# Patient Record
Sex: MALE | Race: WHITE | NOT HISPANIC OR LATINO | Employment: FULL TIME | URBAN - METROPOLITAN AREA
[De-identification: names, ages, dates, MRNs, and addresses within clinical notes are randomized per-mention and may not be internally consistent; named-entity substitution may affect disease eponyms.]

---

## 2017-01-20 ENCOUNTER — ALLSCRIPTS OFFICE VISIT (OUTPATIENT)
Dept: OTHER | Facility: OTHER | Age: 62
End: 2017-01-20

## 2017-03-13 ENCOUNTER — ALLSCRIPTS OFFICE VISIT (OUTPATIENT)
Dept: OTHER | Facility: OTHER | Age: 62
End: 2017-03-13

## 2017-09-19 ENCOUNTER — APPOINTMENT (EMERGENCY)
Dept: RADIOLOGY | Facility: HOSPITAL | Age: 62
End: 2017-09-19
Payer: COMMERCIAL

## 2017-09-19 ENCOUNTER — HOSPITAL ENCOUNTER (EMERGENCY)
Facility: HOSPITAL | Age: 62
Discharge: HOME | End: 2017-09-19
Attending: EMERGENCY MEDICINE
Payer: COMMERCIAL

## 2017-09-19 VITALS
WEIGHT: 180 LBS | SYSTOLIC BLOOD PRESSURE: 142 MMHG | TEMPERATURE: 98.1 F | BODY MASS INDEX: 32.92 KG/M2 | HEART RATE: 93 BPM | RESPIRATION RATE: 20 BRPM | OXYGEN SATURATION: 94 % | DIASTOLIC BLOOD PRESSURE: 69 MMHG

## 2017-09-19 DIAGNOSIS — J44.1 COPD EXACERBATION (HCC): Primary | ICD-10-CM

## 2017-09-19 DIAGNOSIS — J06.9 URI (UPPER RESPIRATORY INFECTION): ICD-10-CM

## 2017-09-19 PROCEDURE — 71020 HB CHEST X-RAY 2VW FRONTAL&LATL: CPT

## 2017-09-19 PROCEDURE — 94640 AIRWAY INHALATION TREATMENT: CPT

## 2017-09-19 PROCEDURE — 99283 EMERGENCY DEPT VISIT LOW MDM: CPT

## 2017-09-19 RX ORDER — ALBUTEROL SULFATE 90 UG/1
2 AEROSOL, METERED RESPIRATORY (INHALATION) EVERY 4 HOURS PRN
Qty: 1 INHALER | Refills: 0 | Status: SHIPPED | OUTPATIENT
Start: 2017-09-19

## 2017-09-19 RX ORDER — IPRATROPIUM BROMIDE AND ALBUTEROL SULFATE 2.5; .5 MG/3ML; MG/3ML
3 SOLUTION RESPIRATORY (INHALATION)
Status: DISCONTINUED | OUTPATIENT
Start: 2017-09-19 | End: 2017-09-19

## 2017-09-19 RX ORDER — IPRATROPIUM BROMIDE AND ALBUTEROL SULFATE 2.5; .5 MG/3ML; MG/3ML
3 SOLUTION RESPIRATORY (INHALATION)
Status: DISCONTINUED | OUTPATIENT
Start: 2017-09-19 | End: 2017-09-19 | Stop reason: HOSPADM

## 2017-09-19 RX ORDER — DOXYCYCLINE HYCLATE 100 MG/1
100 CAPSULE ORAL 2 TIMES DAILY
Qty: 20 CAPSULE | Refills: 0 | Status: SHIPPED | OUTPATIENT
Start: 2017-09-19 | End: 2017-09-29

## 2017-09-19 RX ADMIN — IPRATROPIUM BROMIDE AND ALBUTEROL SULFATE 3 ML: .5; 3 SOLUTION RESPIRATORY (INHALATION) at 06:26

## 2018-01-13 VITALS
BODY MASS INDEX: 33.29 KG/M2 | WEIGHT: 182 LBS | DIASTOLIC BLOOD PRESSURE: 70 MMHG | TEMPERATURE: 96.3 F | HEART RATE: 80 BPM | SYSTOLIC BLOOD PRESSURE: 160 MMHG

## 2018-01-13 VITALS
BODY MASS INDEX: 23.36 KG/M2 | WEIGHT: 172.5 LBS | DIASTOLIC BLOOD PRESSURE: 68 MMHG | RESPIRATION RATE: 16 BRPM | SYSTOLIC BLOOD PRESSURE: 102 MMHG | HEIGHT: 72 IN | HEART RATE: 72 BPM | TEMPERATURE: 96.8 F

## 2021-05-01 ENCOUNTER — APPOINTMENT (EMERGENCY)
Dept: RADIOLOGY | Facility: HOSPITAL | Age: 66
End: 2021-05-01
Payer: COMMERCIAL

## 2021-05-01 ENCOUNTER — HOSPITAL ENCOUNTER (EMERGENCY)
Facility: HOSPITAL | Age: 66
Discharge: HOME | End: 2021-05-01
Attending: EMERGENCY MEDICINE | Admitting: EMERGENCY MEDICINE
Payer: COMMERCIAL

## 2021-05-01 VITALS
TEMPERATURE: 97.7 F | SYSTOLIC BLOOD PRESSURE: 144 MMHG | HEART RATE: 52 BPM | OXYGEN SATURATION: 98 % | RESPIRATION RATE: 14 BRPM | BODY MASS INDEX: 24.38 KG/M2 | DIASTOLIC BLOOD PRESSURE: 80 MMHG | HEIGHT: 72 IN | WEIGHT: 180 LBS

## 2021-05-01 DIAGNOSIS — R10.9 ABDOMINAL PAIN: ICD-10-CM

## 2021-05-01 DIAGNOSIS — M54.9 BACK PAIN: Primary | ICD-10-CM

## 2021-05-01 DIAGNOSIS — R91.1 PULMONARY NODULE: ICD-10-CM

## 2021-05-01 LAB
ALBUMIN SERPL BCP-MCNC: 3.9 G/DL (ref 3.5–5)
ALP SERPL-CCNC: 65 U/L (ref 46–116)
ALT SERPL W P-5'-P-CCNC: 18 U/L (ref 12–78)
ANION GAP SERPL CALCULATED.3IONS-SCNC: 4 MMOL/L (ref 4–13)
AST SERPL W P-5'-P-CCNC: 15 U/L (ref 5–45)
BASOPHILS # BLD AUTO: 0.04 THOUSANDS/ΜL (ref 0–0.1)
BASOPHILS NFR BLD AUTO: 1 % (ref 0–1)
BILIRUB SERPL-MCNC: 0.53 MG/DL (ref 0.2–1)
BILIRUB UR QL STRIP: NEGATIVE
BUN SERPL-MCNC: 13 MG/DL (ref 5–25)
CALCIUM SERPL-MCNC: 8.6 MG/DL (ref 8.3–10.1)
CHLORIDE SERPL-SCNC: 110 MMOL/L (ref 100–108)
CLARITY UR: CLEAR
CO2 SERPL-SCNC: 29 MMOL/L (ref 21–32)
COLOR UR: YELLOW
COLOR, POC: YELLOW
CREAT SERPL-MCNC: 1.02 MG/DL (ref 0.6–1.3)
EOSINOPHIL # BLD AUTO: 0.14 THOUSAND/ΜL (ref 0–0.61)
EOSINOPHIL NFR BLD AUTO: 2 % (ref 0–6)
ERYTHROCYTE [DISTWIDTH] IN BLOOD BY AUTOMATED COUNT: 12.9 % (ref 11.6–15.1)
GFR SERPL CREATININE-BSD FRML MDRD: 78 ML/MIN/1.73SQ M
GLUCOSE SERPL-MCNC: 102 MG/DL (ref 65–140)
GLUCOSE UR STRIP-MCNC: NEGATIVE MG/DL
HCT VFR BLD AUTO: 49.8 % (ref 36.5–49.3)
HGB BLD-MCNC: 16.1 G/DL (ref 12–17)
HGB UR QL STRIP.AUTO: NEGATIVE
IMM GRANULOCYTES # BLD AUTO: 0.03 THOUSAND/UL (ref 0–0.2)
IMM GRANULOCYTES NFR BLD AUTO: 0 % (ref 0–2)
KETONES UR STRIP-MCNC: NEGATIVE MG/DL
LEUKOCYTE ESTERASE UR QL STRIP: NEGATIVE
LIPASE SERPL-CCNC: 37 U/L (ref 73–393)
LYMPHOCYTES # BLD AUTO: 1.38 THOUSANDS/ΜL (ref 0.6–4.47)
LYMPHOCYTES NFR BLD AUTO: 16 % (ref 14–44)
MCH RBC QN AUTO: 31.1 PG (ref 26.8–34.3)
MCHC RBC AUTO-ENTMCNC: 32.3 G/DL (ref 31.4–37.4)
MCV RBC AUTO: 96 FL (ref 82–98)
MONOCYTES # BLD AUTO: 0.88 THOUSAND/ΜL (ref 0.17–1.22)
MONOCYTES NFR BLD AUTO: 10 % (ref 4–12)
NEUTROPHILS # BLD AUTO: 6.41 THOUSANDS/ΜL (ref 1.85–7.62)
NEUTS SEG NFR BLD AUTO: 71 % (ref 43–75)
NITRITE UR QL STRIP: NEGATIVE
NRBC BLD AUTO-RTO: 0 /100 WBCS
PH UR STRIP.AUTO: 6 [PH] (ref 4.5–8)
PLATELET # BLD AUTO: 219 THOUSANDS/UL (ref 149–390)
PMV BLD AUTO: 9.4 FL (ref 8.9–12.7)
POTASSIUM SERPL-SCNC: 4.1 MMOL/L (ref 3.5–5.3)
PROT SERPL-MCNC: 6.7 G/DL (ref 6.4–8.2)
PROT UR STRIP-MCNC: NEGATIVE MG/DL
RBC # BLD AUTO: 5.18 MILLION/UL (ref 3.88–5.62)
SODIUM SERPL-SCNC: 143 MMOL/L (ref 136–145)
SP GR UR STRIP.AUTO: 1.02 (ref 1–1.03)
UROBILINOGEN UR QL STRIP.AUTO: 0.2 E.U./DL
WBC # BLD AUTO: 8.88 THOUSAND/UL (ref 4.31–10.16)

## 2021-05-01 PROCEDURE — 83690 ASSAY OF LIPASE: CPT | Performed by: PHYSICIAN ASSISTANT

## 2021-05-01 PROCEDURE — 71260 CT THORAX DX C+: CPT

## 2021-05-01 PROCEDURE — 96374 THER/PROPH/DIAG INJ IV PUSH: CPT

## 2021-05-01 PROCEDURE — 99284 EMERGENCY DEPT VISIT MOD MDM: CPT

## 2021-05-01 PROCEDURE — 96375 TX/PRO/DX INJ NEW DRUG ADDON: CPT

## 2021-05-01 PROCEDURE — 36415 COLL VENOUS BLD VENIPUNCTURE: CPT | Performed by: PHYSICIAN ASSISTANT

## 2021-05-01 PROCEDURE — 99284 EMERGENCY DEPT VISIT MOD MDM: CPT | Performed by: PHYSICIAN ASSISTANT

## 2021-05-01 PROCEDURE — 85025 COMPLETE CBC W/AUTO DIFF WBC: CPT | Performed by: PHYSICIAN ASSISTANT

## 2021-05-01 PROCEDURE — 80053 COMPREHEN METABOLIC PANEL: CPT | Performed by: PHYSICIAN ASSISTANT

## 2021-05-01 PROCEDURE — 81003 URINALYSIS AUTO W/O SCOPE: CPT

## 2021-05-01 PROCEDURE — 74177 CT ABD & PELVIS W/CONTRAST: CPT

## 2021-05-01 PROCEDURE — G1004 CDSM NDSC: HCPCS

## 2021-05-01 RX ORDER — METHOCARBAMOL 750 MG/1
750 TABLET, FILM COATED ORAL 3 TIMES DAILY
Qty: 9 TABLET | Refills: 0 | Status: SHIPPED | OUTPATIENT
Start: 2021-05-01 | End: 2021-05-04

## 2021-05-01 RX ORDER — KETOROLAC TROMETHAMINE 30 MG/ML
15 INJECTION, SOLUTION INTRAMUSCULAR; INTRAVENOUS ONCE
Status: COMPLETED | OUTPATIENT
Start: 2021-05-01 | End: 2021-05-01

## 2021-05-01 RX ORDER — ONDANSETRON 2 MG/ML
4 INJECTION INTRAMUSCULAR; INTRAVENOUS ONCE
Status: COMPLETED | OUTPATIENT
Start: 2021-05-01 | End: 2021-05-01

## 2021-05-01 RX ADMIN — KETOROLAC TROMETHAMINE 15 MG: 30 INJECTION, SOLUTION INTRAMUSCULAR at 09:27

## 2021-05-01 RX ADMIN — ONDANSETRON 4 MG: 2 INJECTION INTRAMUSCULAR; INTRAVENOUS at 09:24

## 2021-05-01 RX ADMIN — IOHEXOL 100 ML: 350 INJECTION, SOLUTION INTRAVENOUS at 11:25

## 2021-05-01 NOTE — ED PROVIDER NOTES
History  Chief Complaint   Patient presents with    Back Pain     63yo male who presents to ER for evaluation of left lower back pain and lower abdominal pain  Onset gradually 2 days ago  Progressively worsening  Denies injury however does note mowing the lawn the day it back pain began  States it is sharp and constant  Worse with laying flat on back  Improved with siting and leaning toward right  States it does not feel like normal muscle pain he has had in the past  States abdominal discomfort has been for the past month since starting omeprazole for an ulcer diagnosed by endoscopy  States he had a colonoscopy at the same time which was normal  Abdominal pain is now worse since this back pain began  Admits to smoking  Admits to nausea  No v/d/c  Denies sob or chest pain  Denies b/b dysfunction or saddle anesthesia  Walking normally  Tried heating pad, motrin and tylenol without relief  No history of abd or back surgery  History provided by:  Patient  Back Pain  Location:  Lumbar spine  Pain severity:  Moderate  Onset quality:  Gradual  Timing:  Constant  Progression:  Worsening  Chronicity:  New  Associated symptoms: abdominal pain    Associated symptoms: no chest pain and no fever        Prior to Admission Medications   Prescriptions Last Dose Informant Patient Reported? Taking? [id number]-CTL-DX-APAP (DAVINA-SELTZER PLUS COLD & COUGH) 5-2- MG CAPS   Yes No   Sig: Take by mouth   albuterol (PROVENTIL HFA,VENTOLIN HFA) 90 mcg/act inhaler   No No   Sig: Inhale 2 puffs every 4 (four) hours as needed for wheezing   levothyroxine 125 mcg tablet   Yes No   Sig: Take 125 mcg by mouth daily      Facility-Administered Medications: None       Past Medical History:   Diagnosis Date    COPD (chronic obstructive pulmonary disease) ([facility] [address] )     Disease of thyroid gland     Diverticulitis     15 years ago       No past surgical history on file  No family history on file    I have reviewed and agree with the history as documented  E-Cigarette/Vaping    E-Cigarette Use Never User      E-Cigarette/Vaping Substances     Social History     Tobacco Use    Smoking status: Current Every Day Smoker     Packs/day: 1 00    Smokeless tobacco: Never Used   Substance Use Topics    Alcohol use: No     Frequency: Never     Binge frequency: Never    Drug use: No       Review of Systems   Constitutional: Negative for chills and fever  Respiratory: Negative for shortness of breath  Cardiovascular: Negative for chest pain  Gastrointestinal: Positive for abdominal pain and nausea  Negative for constipation, diarrhea and vomiting  Genitourinary: Negative for decreased urine volume, difficulty urinating and hematuria  Musculoskeletal: Positive for back pain  Negative for myalgias and neck pain  Skin: Negative for rash and wound  All other systems reviewed and are negative  Physical Exam  Physical Exam  Vitals signs and nursing note reviewed  Constitutional:       Appearance: Normal appearance  He is well-developed  HENT:      Head: Normocephalic and atraumatic  Right Ear: External ear normal       Left Ear: External ear normal    Eyes:      Conjunctiva/sclera: Conjunctivae normal    Neck:      Musculoskeletal: Neck supple  Cardiovascular:      Rate and Rhythm: Normal rate and regular rhythm  Heart sounds: Normal heart sounds  Pulmonary:      Effort: Pulmonary effort is normal       Breath sounds: Wheezing (inspiratory bilateral, lower lung fields) present  Abdominal:      General: Bowel sounds are normal  There is no distension  Palpations: Abdomen is soft  Tenderness: There is abdominal tenderness in the right lower quadrant, suprapubic area and left lower quadrant  There is no right CVA tenderness, left CVA tenderness or guarding  Musculoskeletal:      Lumbar back: He exhibits pain  He exhibits normal range of motion, no tenderness, no bony tenderness, no swelling and no edema  Back:       Comments: Negative straight leg raise  No foot drop  Able to walk  Negative michel test   Skin:     General: Skin is warm and dry  Findings: No rash  Neurological:      Mental Status: He is alert and oriented to person, place, and time  Deep Tendon Reflexes:      Reflex Scores:       Patellar reflexes are 2+ on the right side and 2+ on the left side    Psychiatric:         Mood and Affect: Mood normal          Vital Signs  ED Triage Vitals   Temperature Pulse Respirations Blood Pressure SpO2   05/01/21 0829 05/01/21 0829 05/01/21 0829 05/01/21 0829 05/01/21 0829   97 7 °F (36 5 °C) 60 14 158/87 98 %      Temp Source Heart Rate Source Patient Position - Orthostatic VS BP Location FiO2 (%)   05/01/21 0829 05/01/21 0829 05/01/21 0829 05/01/21 0829 --   Tympanic Monitor Sitting Left arm       Pain Score       05/01/21 0927       7           Vitals:    05/01/21 0829 05/01/21 0916   BP: 158/87 144/80   Pulse: 60 (!) 52   Patient Position - Orthostatic VS: Sitting Lying         Visual Acuity  Visual Acuity      Most Recent Value   L Pupil Size (mm)  3   R Pupil Size (mm)  3          ED Medications  Medications   ondansetron (ZOFRAN) injection 4 mg (4 mg Intravenous Given 5/1/21 0924)   ketorolac (TORADOL) injection 15 mg (15 mg Intravenous Given 5/1/21 0927)   iohexol (OMNIPAQUE) 350 MG/ML injection (MULTI-DOSE) 100 mL (100 mL Intravenous Given 5/1/21 1125)       Diagnostic Studies  Results Reviewed     Procedure Component Value Units Date/Time    Lipase [[id number]]  (Abnormal) Collected: 05/01/21 1014    Lab Status: Final result Specimen: Blood from Arm, Right Updated: 05/01/21 1047     Lipase 37 u/L     Comprehensive metabolic panel [[id number]]  (Abnormal) Collected: 05/01/21 1014    Lab Status: Final result Specimen: Blood from Arm, Right Updated: 05/01/21 1047     Sodium 143 mmol/L      Potassium 4 1 mmol/L      Chloride 110 mmol/L      CO2 29 mmol/L      ANION GAP 4 mmol/L      BUN 13 mg/dL Creatinine 1 02 mg/dL      Glucose 102 mg/dL      Calcium 8 6 mg/dL      AST 15 U/L      ALT 18 U/L      Alkaline Phosphatase 65 U/L      Total Protein 6 7 g/dL      Albumin 3 9 g/dL      Total Bilirubin 0 53 mg/dL      eGFR 78 ml/min/1 73sq m     Narrative:      [facility] guidelines for Chronic Kidney Disease (CKD):     Stage 1 with normal or high GFR (GFR > 90 mL/min/1 73 square meters)    Stage 2 Mild CKD (GFR = 60-89 mL/min/1 73 square meters)    Stage 3A Moderate CKD (GFR = 45-59 mL/min/1 73 square meters)    Stage 3B Moderate CKD (GFR = 30-44 mL/min/1 73 square meters)    Stage 4 Severe CKD (GFR = 15-29 mL/min/1 73 square meters)    Stage 5 End Stage CKD (GFR <15 mL/min/1 73 square meters)  Note: GFR calculation is accurate only with a steady state creatinine    CBC and differential [[id number]]  (Abnormal) Collected: 05/01/21 0913    Lab Status: Final result Specimen: Blood from Line, Venous Updated: 05/01/21 0926     WBC 8 88 Thousand/uL      RBC 5 18 Million/uL      Hemoglobin 16 1 g/dL      Hematocrit 49 8 %      MCV 96 fL      MCH 31 1 pg      MCHC 32 3 g/dL      RDW 12 9 %      MPV 9 4 fL      Platelets 582 Thousands/uL      nRBC 0 /100 WBCs      Neutrophils Relative 71 %      Immat GRANS % 0 %      Lymphocytes Relative 16 %      Monocytes Relative 10 %      Eosinophils Relative 2 %      Basophils Relative 1 %      Neutrophils Absolute 6 41 Thousands/µL      Immature Grans Absolute 0 03 Thousand/uL      Lymphocytes Absolute 1 38 Thousands/µL      Monocytes Absolute 0 88 Thousand/µL      Eosinophils Absolute 0 14 Thousand/µL      Basophils Absolute 0 04 Thousands/µL     POCT urinalysis dipstick [[id number]]  (Normal) Resulted: 05/01/21 0854    Lab Status: Final result Specimen: Urine Updated: 05/01/21 0854     Color, UA yellow    Urine Macroscopic, POC [[id number]] Collected: 05/01/21 0852    Lab Status: Final result Specimen: Urine Updated: 05/01/21 0853     Color, UA Yellow Clarity, UA Clear     pH, UA 6 0     Leukocytes, UA Negative     Nitrite, UA Negative     Protein, UA Negative mg/dl      Glucose, UA Negative mg/dl      Ketones, UA Negative mg/dl      Urobilinogen, UA 0 2 E U /dl      Bilirubin, UA Negative     Blood, UA Negative     Specific Gravity, UA 1 020    Narrative:      CLINITEK RESULT                 CT chest abdomen pelvis w contrast   Final Result by Judit Valdez MD (05/01 1239)      CHEST:   1  No findings of metastatic disease in the chest       2   Moderate emphysema with bullous changes  3   Right upper lobe 4 mm nodular opacity  Based on current Fleischner Society 2017 Guidelines on incidental pulmonary nodule, because the patient is considered high risk for lung cancer, 12 month follow-up non-contrast chest CT is recommended  ABDOMEN AND PELVIS:   1  No findings of metastatic disease in the abdomen or pelvis  2   Colonic diverticulosis  3   Again noted marked degenerative disc disease of the lumbar spine most pronounced at L3-L4 and L4-L5 with apparent central canal stenosis  The study was marked in [facility] for immediate notification  Workstation performed: [id number]                    Procedures  Procedures         ED Course  ED Course as of May 01 1302   Sat May 01, 2021   5958 Patient states pain and nausea are improved, reviewed current results with him  No new complaints  1139 Reviewed rest of labs with pt  A/w ct report  He states pain increased during ct when lifting his arm above his head - therefore will medicate - he will have his wife come pick him up                                SBIRT 22yo+      Most Recent Value   SBIRT (22 yo +)   In order to provide better care to our patients, we are screening all of our patients for alcohol and drug use  Would it be okay to ask you these screening questions? Yes Filed at: 05/01/2021 0915   Initial Alcohol Screen: US AUDIT-C    1   How often do you have a drink containing alcohol?  0 Filed at: 05/01/2021 0915   2  How many drinks containing alcohol do you have on a typical day you are drinking? 0 Filed at: 05/01/2021 0915   3a  Male UNDER 65: How often do you have five or more drinks on one occasion? 0 Filed at: 05/01/2021 0915   Audit-C Score  0 Filed at: 05/01/2021 9274   JOSH: How many times in the past year have you    Used an illegal drug or used a prescription medication for non-medical reasons? Never Filed at: 05/01/2021 0915                    MDM  Number of Diagnoses or Management Options  Abdominal pain: new and does not require workup  Back pain: new and does not require workup  Pulmonary nodule:      Amount and/or Complexity of Data Reviewed  Clinical lab tests: ordered and reviewed  Tests in the radiology section of CPT®: ordered and reviewed  Decide to obtain previous medical records or to obtain history from someone other than the patient: yes (Past h/o diverticulitis and copd)  Discuss the patient with other providers: yes (ED attending)    Risk of Complications, Morbidity, and/or Mortality  Presenting problems: moderate  Diagnostic procedures: moderate  Management options: moderate  General comments: Differential diagnosis includes but is not limited to: lung mass, bone mets, worsening lumbar DDD, kidney stone, pyelonephritis, diverticulitis, appendicitis      Discussed with patient stretching, f/u CT in one year for lung nodule, discuss omeprazole with pcp/GI physician  Advised return to ER if worse        Patient Progress  Patient progress: stable      Disposition  Final diagnoses:   Back pain   Abdominal pain   Pulmonary nodule     Time reflects when diagnosis was documented in both MDM as applicable and the Disposition within this note     Time User Action Codes Description Comment    5/1/2021 12:59 PM Tavo Marquis Add [M54 9] Back pain     5/1/2021 12:59 PM Bryant Drought NELIA Add [R10 9] Abdominal pain     5/1/2021  1:00 PM Tavo [facility] Add [R91 1] Pulmonary nodule       ED Disposition     ED Disposition Condition Date/Time Comment    Discharge Stable Sat May 1, 2021 12:59 PM Lexie Blanco discharge to home/self care  Follow-up Information     Follow up With Specialties Details Why Contact Info Additional Information    Vikas Townsend MD Family Medicine In 3 days  [address] [facility] [id number]  [address] Emergency Department Emergency Medicine  If symptoms worsen [address]  [address] [facility] [address] [facility] Emergency Department, [address], [facility]   [phone]          Patient's Medications   Discharge Prescriptions    METHOCARBAMOL (ROBAXIN) 750 MG TABLET    Take 1 tablet (750 mg total) by mouth 3 (three) times a day for 3 days       Start Date: 5/1/2021  End Date: 5/4/2021       Order Dose: 750 mg       Quantity: 9 tablet    Refills: 0     No discharge procedures on file      PDMP Review     None          ED Provider  Electronically Signed by           Charisse Harden PA-C  05/01/21 2175

## 2021-05-01 NOTE — ED TRIAGE NOTES
Patient states mowing his grass with a push mower 2 days ago when he noticed onset of back pain  No injury recalled  No relief with heat, OTC Motrin & Tylenol  No distal neuro defects  Pain left lumbar with radiation into suprapubic area  Mild nausea no emesis, no difficulty voiding  Patient with Hx of back pain, but states ,"this feels different"

## 2023-04-20 ENCOUNTER — APPOINTMENT (INPATIENT)
Dept: NON INVASIVE DIAGNOSTICS | Facility: HOSPITAL | Age: 68
End: 2023-04-20

## 2023-04-20 ENCOUNTER — APPOINTMENT (EMERGENCY)
Dept: RADIOLOGY | Facility: HOSPITAL | Age: 68
End: 2023-04-20

## 2023-04-20 ENCOUNTER — HOSPITAL ENCOUNTER (INPATIENT)
Facility: HOSPITAL | Age: 68
LOS: 1 days | Discharge: HOME | End: 2023-04-21
Attending: EMERGENCY MEDICINE | Admitting: INTERNAL MEDICINE

## 2023-04-20 DIAGNOSIS — R07.9 CHEST PAIN: Primary | ICD-10-CM

## 2023-04-20 DIAGNOSIS — R07.9 CHEST PAIN: ICD-10-CM

## 2023-04-20 PROBLEM — Z72.0 TOBACCO ABUSE: Status: ACTIVE | Noted: 2023-04-20

## 2023-04-20 PROBLEM — R06.09 DYSPNEA ON EXERTION: Status: ACTIVE | Noted: 2023-04-20

## 2023-04-20 PROBLEM — E89.0 POSTABLATIVE HYPOTHYROIDISM: Status: ACTIVE | Noted: 2023-04-20

## 2023-04-20 PROBLEM — E78.5 HYPERLIPIDEMIA: Status: ACTIVE | Noted: 2023-04-20

## 2023-04-20 PROBLEM — J44.9 COPD (CHRONIC OBSTRUCTIVE PULMONARY DISEASE) (HCC): Status: ACTIVE | Noted: 2023-04-20

## 2023-04-20 LAB
2HR DELTA HS TROPONIN: -1 NG/L
ALBUMIN SERPL BCP-MCNC: 4.2 G/DL (ref 3.5–5)
ALP SERPL-CCNC: 72 U/L (ref 46–116)
ALT SERPL W P-5'-P-CCNC: 39 U/L (ref 12–78)
ANION GAP SERPL CALCULATED.3IONS-SCNC: 4 MMOL/L (ref 4–13)
AORTIC ROOT: 3.2 CM
AORTIC VALVE MEAN VELOCITY: 6.6 M/S
APICAL FOUR CHAMBER EJECTION FRACTION: 59 %
ASCENDING AORTA: 3.2 CM
AST SERPL W P-5'-P-CCNC: 28 U/L (ref 5–45)
ATRIAL RATE: 62 BPM
ATRIAL RATE: 64 BPM
AV AREA BY CONTINUOUS VTI: 2.7 CM2
AV AREA PEAK VELOCITY: 2.6 CM2
AV LVOT MEAN GRADIENT: 2 MMHG
AV LVOT PEAK GRADIENT: 3 MMHG
AV MEAN GRADIENT: 2 MMHG
AV PEAK GRADIENT: 4 MMHG
AV VALVE AREA: 2.74 CM2
AV VELOCITY RATIO: 0.82
BASOPHILS # BLD AUTO: 0.03 THOUSANDS/ΜL (ref 0–0.1)
BASOPHILS NFR BLD AUTO: 0 % (ref 0–1)
BILIRUB SERPL-MCNC: 0.7 MG/DL (ref 0.2–1)
BUN SERPL-MCNC: 17 MG/DL (ref 5–25)
CALCIUM SERPL-MCNC: 9.4 MG/DL (ref 8.3–10.1)
CARDIAC TROPONIN I PNL SERPL HS: 2 NG/L
CARDIAC TROPONIN I PNL SERPL HS: 3 NG/L
CHLORIDE SERPL-SCNC: 105 MMOL/L (ref 96–108)
CHOLEST SERPL-MCNC: 183 MG/DL
CO2 SERPL-SCNC: 28 MMOL/L (ref 21–32)
CREAT SERPL-MCNC: 1.11 MG/DL (ref 0.6–1.3)
DOP CALC AO PEAK VEL: 1.03 M/S
DOP CALC AO VTI: 24.05 CM
DOP CALC LVOT AREA: 3.14 CM2
DOP CALC LVOT DIAMETER: 2 CM
DOP CALC LVOT PEAK VEL VTI: 20.95 CM
DOP CALC LVOT PEAK VEL: 0.84 M/S
DOP CALC LVOT STROKE INDEX: 32.8 ML/M2
DOP CALC LVOT STROKE VOLUME: 65.78 CM3
E WAVE DECELERATION TIME: 205 MS
EOSINOPHIL # BLD AUTO: 0.24 THOUSAND/ΜL (ref 0–0.61)
EOSINOPHIL NFR BLD AUTO: 3 % (ref 0–6)
ERYTHROCYTE [DISTWIDTH] IN BLOOD BY AUTOMATED COUNT: 12.7 % (ref 11.6–15.1)
FRACTIONAL SHORTENING: 36 % (ref 28–44)
GFR SERPL CREATININE-BSD FRML MDRD: 69 ML/MIN/1.73SQ M
GLUCOSE SERPL-MCNC: 118 MG/DL (ref 65–140)
HCT VFR BLD AUTO: 49.4 % (ref 36.5–49.3)
HDLC SERPL-MCNC: 64 MG/DL
HGB BLD-MCNC: 15.9 G/DL (ref 12–17)
IMM GRANULOCYTES # BLD AUTO: 0.02 THOUSAND/UL (ref 0–0.2)
IMM GRANULOCYTES NFR BLD AUTO: 0 % (ref 0–2)
INTERVENTRICULAR SEPTUM IN DIASTOLE (PARASTERNAL SHORT AXIS VIEW): 0.8 CM
INTERVENTRICULAR SEPTUM: 0.8 CM (ref 0.6–1.1)
LAAS-AP2: 19.2 CM2
LAAS-AP4: 16.4 CM2
LDLC SERPL CALC-MCNC: 101 MG/DL (ref 0–100)
LDLC SERPL DIRECT ASSAY-MCNC: 99 MG/DL (ref 0–100)
LEFT ATRIUM SIZE: 2.6 CM
LEFT INTERNAL DIMENSION IN SYSTOLE: 3.2 CM (ref 2.1–4)
LEFT VENTRICLE DIASTOLIC VOLUME (MOD BIPLANE): 111 ML
LEFT VENTRICLE SYSTOLIC VOLUME (MOD BIPLANE): 40 ML
LEFT VENTRICULAR INTERNAL DIMENSION IN DIASTOLE: 5 CM (ref 3.5–6)
LEFT VENTRICULAR POSTERIOR WALL IN END DIASTOLE: 0.8 CM
LEFT VENTRICULAR STROKE VOLUME: 79 ML
LV EF: 64 %
LVSV (TEICH): 79 ML
LYMPHOCYTES # BLD AUTO: 1.54 THOUSANDS/ΜL (ref 0.6–4.47)
LYMPHOCYTES NFR BLD AUTO: 19 % (ref 14–44)
MCH RBC QN AUTO: 30.3 PG (ref 26.8–34.3)
MCHC RBC AUTO-ENTMCNC: 32.2 G/DL (ref 31.4–37.4)
MCV RBC AUTO: 94 FL (ref 82–98)
MONOCYTES # BLD AUTO: 0.95 THOUSAND/ΜL (ref 0.17–1.22)
MONOCYTES NFR BLD AUTO: 12 % (ref 4–12)
MV E'TISSUE VEL-SEP: 14 CM/S
MV PEAK A VEL: 0.49 M/S
MV PEAK E VEL: 83 CM/S
MV STENOSIS PRESSURE HALF TIME: 59 MS
MV VALVE AREA P 1/2 METHOD: 3.73 CM2
NEUTROPHILS # BLD AUTO: 5.31 THOUSANDS/ΜL (ref 1.85–7.62)
NEUTS SEG NFR BLD AUTO: 66 % (ref 43–75)
NONHDLC SERPL-MCNC: 119 MG/DL
NRBC BLD AUTO-RTO: 0 /100 WBCS
P AXIS: 81 DEGREES
P AXIS: 81 DEGREES
PLATELET # BLD AUTO: 236 THOUSANDS/UL (ref 149–390)
PMV BLD AUTO: 9.6 FL (ref 8.9–12.7)
POTASSIUM SERPL-SCNC: 4 MMOL/L (ref 3.5–5.3)
PR INTERVAL: 178 MS
PR INTERVAL: 208 MS
PROT SERPL-MCNC: 7.3 G/DL (ref 6.4–8.4)
QRS AXIS: 186 DEGREES
QRS AXIS: 253 DEGREES
QRSD INTERVAL: 100 MS
QRSD INTERVAL: 80 MS
QT INTERVAL: 398 MS
QT INTERVAL: 408 MS
QTC INTERVAL: 403 MS
QTC INTERVAL: 420 MS
RBC # BLD AUTO: 5.24 MILLION/UL (ref 3.88–5.62)
RIGHT ATRIUM AREA SYSTOLE A4C: 18.5 CM2
RIGHT VENTRICLE ID DIMENSION: 3.8 CM
SL CV LEFT ATRIUM LENGTH A2C: 4.8 CM
SL CV LV EF: 60
SL CV PED ECHO LEFT VENTRICLE DIASTOLIC VOLUME (MOD BIPLANE) 2D: 121 ML
SL CV PED ECHO LEFT VENTRICLE SYSTOLIC VOLUME (MOD BIPLANE) 2D: 41 ML
SODIUM SERPL-SCNC: 137 MMOL/L (ref 135–147)
T WAVE AXIS: 54 DEGREES
T WAVE AXIS: 59 DEGREES
TRICUSPID ANNULAR PLANE SYSTOLIC EXCURSION: 2.9 CM
TRIGL SERPL-MCNC: 88 MG/DL
VENTRICULAR RATE: 62 BPM
VENTRICULAR RATE: 64 BPM
WBC # BLD AUTO: 8.09 THOUSAND/UL (ref 4.31–10.16)

## 2023-04-20 RX ORDER — OXYCODONE HYDROCHLORIDE 5 MG/1
5 TABLET ORAL EVERY 4 HOURS PRN
Status: DISCONTINUED | OUTPATIENT
Start: 2023-04-20 | End: 2023-04-21 | Stop reason: HOSPADM

## 2023-04-20 RX ORDER — ASPIRIN 81 MG/1
81 TABLET, CHEWABLE ORAL DAILY
Status: DISCONTINUED | OUTPATIENT
Start: 2023-04-22 | End: 2023-04-21

## 2023-04-20 RX ORDER — NICOTINE 21 MG/24HR
1 PATCH, TRANSDERMAL 24 HOURS TRANSDERMAL DAILY
Status: DISCONTINUED | OUTPATIENT
Start: 2023-04-20 | End: 2023-04-21 | Stop reason: HOSPADM

## 2023-04-20 RX ORDER — SODIUM CHLORIDE 9 MG/ML
75 INJECTION, SOLUTION INTRAVENOUS CONTINUOUS
Status: DISPENSED | OUTPATIENT
Start: 2023-04-21 | End: 2023-04-21

## 2023-04-20 RX ORDER — LEVOTHYROXINE SODIUM 0.12 MG/1
125 TABLET ORAL
Status: DISCONTINUED | OUTPATIENT
Start: 2023-04-20 | End: 2023-04-21 | Stop reason: HOSPADM

## 2023-04-20 RX ORDER — ATORVASTATIN CALCIUM 10 MG/1
10 TABLET, FILM COATED ORAL DAILY
COMMUNITY

## 2023-04-20 RX ORDER — ATORVASTATIN CALCIUM 20 MG/1
20 TABLET, FILM COATED ORAL
Status: DISCONTINUED | OUTPATIENT
Start: 2023-04-20 | End: 2023-04-21 | Stop reason: HOSPADM

## 2023-04-20 RX ORDER — NITROGLYCERIN 0.4 MG/1
0.4 TABLET SUBLINGUAL
Status: DISCONTINUED | OUTPATIENT
Start: 2023-04-20 | End: 2023-04-21 | Stop reason: HOSPADM

## 2023-04-20 RX ORDER — ACETAMINOPHEN 325 MG/1
650 TABLET ORAL EVERY 6 HOURS PRN
Status: DISCONTINUED | OUTPATIENT
Start: 2023-04-20 | End: 2023-04-21 | Stop reason: HOSPADM

## 2023-04-20 RX ORDER — ALBUTEROL SULFATE 90 UG/1
2 AEROSOL, METERED RESPIRATORY (INHALATION) EVERY 4 HOURS PRN
Status: DISCONTINUED | OUTPATIENT
Start: 2023-04-20 | End: 2023-04-21 | Stop reason: HOSPADM

## 2023-04-20 RX ORDER — ONDANSETRON 2 MG/ML
4 INJECTION INTRAMUSCULAR; INTRAVENOUS EVERY 6 HOURS PRN
Status: DISCONTINUED | OUTPATIENT
Start: 2023-04-20 | End: 2023-04-21 | Stop reason: HOSPADM

## 2023-04-20 RX ORDER — ASPIRIN 81 MG/1
81 TABLET, CHEWABLE ORAL DAILY
Status: DISCONTINUED | OUTPATIENT
Start: 2023-04-20 | End: 2023-04-20

## 2023-04-20 RX ORDER — ENOXAPARIN SODIUM 100 MG/ML
40 INJECTION SUBCUTANEOUS DAILY
Status: DISCONTINUED | OUTPATIENT
Start: 2023-04-20 | End: 2023-04-21 | Stop reason: HOSPADM

## 2023-04-20 RX ORDER — ASPIRIN 81 MG/1
324 TABLET, CHEWABLE ORAL ONCE
Status: COMPLETED | OUTPATIENT
Start: 2023-04-21 | End: 2023-04-21

## 2023-04-20 RX ORDER — KETOROLAC TROMETHAMINE 30 MG/ML
15 INJECTION, SOLUTION INTRAMUSCULAR; INTRAVENOUS ONCE
Status: COMPLETED | OUTPATIENT
Start: 2023-04-20 | End: 2023-04-20

## 2023-04-20 RX ADMIN — SODIUM CHLORIDE 1000 ML: 0.9 INJECTION, SOLUTION INTRAVENOUS at 12:45

## 2023-04-20 RX ADMIN — NICOTINE 1 PATCH: 21 PATCH, EXTENDED RELEASE TRANSDERMAL at 16:32

## 2023-04-20 RX ADMIN — ATORVASTATIN CALCIUM 20 MG: 20 TABLET, FILM COATED ORAL at 16:35

## 2023-04-20 RX ADMIN — ENOXAPARIN SODIUM 40 MG: 40 INJECTION SUBCUTANEOUS at 16:32

## 2023-04-20 RX ADMIN — KETOROLAC TROMETHAMINE 15 MG: 30 INJECTION, SOLUTION INTRAMUSCULAR; INTRAVENOUS at 12:46

## 2023-04-20 RX ADMIN — TICAGRELOR 180 MG: 90 TABLET ORAL at 16:46

## 2023-04-20 NOTE — ASSESSMENT & PLAN NOTE
Patient with underlying Graves' disease status post ablation  Continue levothyroxine   Check thyroid study

## 2023-04-20 NOTE — H&P
[address]  H&P  Name: Elie Cruz 74 y o  male I MRN: [id number]  Unit/Bed#: ED 16 I Date of Admission: 4/20/2023   Date of Service: 4/20/2023 I Hospital Day: 0      Assessment/Plan   * Dyspnea on exertion  Assessment & Plan  Patient presented with symptoms of dyspnea on exertion, left-sided exertional chest pain and mild dizziness  EKG without acute ischemic changes  negative initial troponin  Negative nuclear stress test in June 2022  Rule out unstable angina/cardiac ischemia  Follow on serial troponin  Monitor on telemetry  Start aspirin  Consult cardiology for further management  Check cardiac echo    Hyperlipidemia  Assessment & Plan  Patient was on statin for the past 5-month however he stopped taking it 4 days ago due to muscle ache  Check fasting lipid profile    Postablative hypothyroidism  Assessment & Plan  Patient with underlying Graves' disease status post ablation  Continue levothyroxine   Check thyroid study      Tobacco abuse  Assessment & Plan  Start nicotine patch    COPD (chronic obstructive pulmonary disease) ([facility])  Assessment & Plan  Without acute exacerbation  Continue bronchodilator       VTE Pharmacologic Prophylaxis: VTE Score: 5 High Risk (Score >/= 5) - Pharmacological DVT Prophylaxis Ordered: enoxaparin (Lovenox)  Sequential Compression Devices Ordered  Code Status: Level 1 - Full Code   Discussion with family: Patient  Anticipated Length of Stay: Patient will be admitted on an inpatient basis with an anticipated length of stay of greater than 2 midnights secondary to evaluation of chest pain      Total Time Spent on Date of Encounter in care of patient: 85 minutes This time was spent on one or more of the following: performing physical exam; counseling and coordination of care; obtaining or reviewing history; documenting in the medical record; reviewing/ordering tests, medications or procedures; communicating with other healthcare professionals and discussing with patient's family/caregivers  Chief Complaint:   left sided chest pain     History of Present Illness:  Adela Mom is a 74 y o  male with a PMH of COPD, Grave's disease, HLD who presents with left sided chest pain upon exertion for x2 weeks  Describes chest pain as chest tightness and pressure, and radiates to right arm  Associated symptoms of dizziness and SOB when episode occured  Denies chest pain at rest  Denies fevers, chills, abdominal pain,n/v/d, leg swelling, changes in appetite  He stopped taking statin 4 days due to muscle weakness  Negative troponin  EKG showed no signs of ischemia  Nuclear stress test June 2022 was negative  Review of Systems:  Review of Systems   Constitutional: Negative for chills and fever  HENT: Negative for sore throat  Respiratory: Positive for cough and chest tightness  Negative for shortness of breath  Cardiovascular: Positive for chest pain  Negative for palpitations and leg swelling  Gastrointestinal: Negative for abdominal pain, blood in stool, diarrhea, nausea and vomiting  Endocrine: Negative for polyuria  Genitourinary: Negative for difficulty urinating and dysuria  Neurological: Negative for dizziness, speech difficulty, numbness and headaches  All other systems reviewed and are negative  Past Medical and Surgical History:   Past Medical History:   Diagnosis Date   • COPD (chronic obstructive pulmonary disease) ([facility] [address] )    • Disease of thyroid gland    • Diverticulitis     15 years ago       History reviewed  No pertinent surgical history  Meds/Allergies:  Prior to Admission medications    Medication Sig Start Date End Date Taking?  Authorizing Provider   albuterol (PROVENTIL HFA,VENTOLIN HFA) 90 mcg/act inhaler Inhale 2 puffs every 4 (four) hours as needed for wheezing 9/19/17   Hernán Santiago DO   levothyroxine 125 mcg tablet Take 125 mcg by mouth daily    Historical Provider, MD   methocarbamol (ROBAXIN) 750 mg tablet Take 1 tablet (750 mg total) by mouth 3 (three) times a day for 3 days 5/1/21 5/4/21  Lisa Padilla PA-C   Pbuwbdizj-PYV-AT-APAP (DAVINA-SELTZER PLUS COLD & COUGH) 5-2- MG CAPS Take by mouth    Historical Provider, MD RANDLE have reviewed home medications with patient personally  Allergies: No Known Allergies    Social History:  Marital Status: /Civil Union     Substance Use History:   Social History     Substance and Sexual Activity   Alcohol Use No     Social History     Tobacco Use   Smoking Status Every Day   • Packs/day: 1 00   • Types: Cigarettes   Smokeless Tobacco Never     Social History     Substance and Sexual Activity   Drug Use No       Family History:  History reviewed  No pertinent family history  Physical Exam:     Vitals:   Blood Pressure: 143/68 (04/20/23 1353)  Pulse: 59 (04/20/23 1353)  Temp Source: Oral (04/20/23 1108)  Respirations: 16 (04/20/23 1353)  Weight - Scale: 81 6 kg (180 lb) (04/20/23 1107)  SpO2: 95 % (04/20/23 1353)    Physical Exam  Vitals reviewed  Constitutional:       General: He is not in acute distress  Appearance: Normal appearance  He is not ill-appearing  HENT:      Head: Normocephalic and atraumatic  Mouth/Throat:      Mouth: Mucous membranes are moist       Pharynx: No oropharyngeal exudate  Eyes:      General: No scleral icterus  Extraocular Movements: Extraocular movements intact  Cardiovascular:      Rate and Rhythm: Normal rate and regular rhythm  Pulses: Normal pulses  Heart sounds: Normal heart sounds  Pulmonary:      Effort: Pulmonary effort is normal       Breath sounds: Normal breath sounds  No wheezing or rhonchi  Abdominal:      General: Bowel sounds are normal  There is no distension  Palpations: Abdomen is soft  Tenderness: There is no abdominal tenderness  Musculoskeletal:         General: Normal range of motion        Cervical back: Normal range of motion and neck supple  Right lower leg: No edema  Left lower leg: No edema  Skin:     General: Skin is warm and dry  Neurological:      General: No focal deficit present  Mental Status: He is alert and oriented to person, place, and time  Psychiatric:         Mood and Affect: Mood normal          Additional Data:     Lab Results:  Results from last 7 days   Lab Units 04/20/23  1149   WBC Thousand/uL 8 09   HEMOGLOBIN g/dL 15 9   HEMATOCRIT % 49 4*   PLATELETS Thousands/uL 236   NEUTROS PCT % 66   LYMPHS PCT % 19   MONOS PCT % 12   EOS PCT % 3     Results from last 7 days   Lab Units 04/20/23  1149   SODIUM mmol/L 137   POTASSIUM mmol/L 4 0   CHLORIDE mmol/L 105   CO2 mmol/L 28   BUN mg/dL 17   CREATININE mg/dL 1 11   ANION GAP mmol/L 4   CALCIUM mg/dL 9 4   ALBUMIN g/dL 4 2   TOTAL BILIRUBIN mg/dL 0 70   ALK PHOS U/L 72   ALT U/L 39   AST U/L 28   GLUCOSE RANDOM mg/dL 118                       Lines/Drains:  Invasive Devices     Peripheral Intravenous Line  Duration           Peripheral IV 05/01/21 Right Hand 719 days    Peripheral IV 04/20/23 Right;Ventral (anterior) Forearm <1 day                    Imaging: reviewed  XR chest 2 views   ED Interpretation by Svetlana Alberto MD (04/20 8108)   Hyperinflated / COPD  No obvious pneumonia          EKG and Other Studies Reviewed on Admission:   · Personally reviewed showed normal sinus rhythm at 64 bpm without acute ischemic changes    ** Please Note: This note has been constructed using a voice recognition system   **

## 2023-04-20 NOTE — CONSULTS
Consultation - Cardiology Team One  Tati Wen 74 y o  male MRN: [id number]  Unit/Bed#: ED 17 Encounter: [id number]    Inpatient consult to Cardiology  Consult performed by: ORI Carmen  Consult ordered by: Avis Sosa DO      Physician Requesting Consult: Avis Sosa DO  Reason for Consult / Principal Problem: BRAVO    Assessment    1  BRAVO, possible unstable angina  Exertional chest tightness and SOB for at least the past three weeks, episode occurred at rest this morning  Hs troponin negative  ECG- NSR  Exercise nuclear stress test 06/2022 without ischemia, normal LV function  Risk factors for CAD include dyslipidemia and tobacco abuse  BP also mildly elevated but no dx of HTN prior to admission  2   Hyperlipidemia- lipid panel from October 2022-, TG 90, HDL 61,   Was started on atorvastatin 10 mg daily but recently stopped taking because he though it was contributing to his fatigue and some occasional muscle aches  Hasn't had any improvement in symptoms since stopping and is agreeable to resume statin therapy  3   COPD  4  Hypothyroidism- TSH pending  5  Tobacco abuse- 1 ppd, sometimes more since retiring  Plan  1  Continue ASA and resume atorvastatin 20 mg daily  2  Hold off on beta blocker for now due to baseline bradycardia in the 50s-60s  3  Follow up on echocardiogram results  4  Check TSH, A1c, and lipid panel  5  NPO after midnight for cardiac catheterization on 4/21- risks/benefits discussed with patient and he is agreeable  6  Smoking cessation discussed     History of Present Illness   HPI: Tati Wen is a 74y o  year old male with HLD, COPD, hypothyroidism, and tobacco abuse who presented to the ED today with c/o exertional chest tightness  He first noticed symptoms about 3 weeks ago when he was mowing his grass although his initial symptoms were of near syncope, not chest tightness or shortness of breath    He has noticed increased fatigue as well as chest "tightness with heavy exertion  This morning he experienced an episode of chest tightness and shortness of breath while at rest and told his wife that he should probably get checked out he came to the ED for further evaluation  He is retired and stays active around the house mowing the grass and taking his dog for long walks  He has had no symptoms with walking the dog or even running after her at the dog park  He did notice chest tightness when mowing the grass earlier this week  Labs/work up: Hs troponin 3-->2  CBC and CMP unremarkable  CXR without acute cardiopulmonary disease  Echocardiogram in progress  Exercise nuclear stress test in June 2022 negative for ischemia  Exercised for 10 minutes 21 seconds  Calculated LVEF 66%  CT chest as part of lung cancer screening in April 2022 showed mild coronary artery calcification  BP mildly elevated on arrival 163/87 but otherwise vitals are stable and he is on room air  He has no symptoms at time of my exam   He smokes at least 1 pack of cigarettes per day but denies any drug or alcohol use  He denies any known family history of coronary artery disease  EKG reviewed personally: NSR    Telemetry reviewed personally: NSR and sinus bradycardia    Review of Systems   Constitutional: Positive for malaise/fatigue  Negative for chills, decreased appetite, diaphoresis and weight gain  Cardiovascular: Positive for chest pain (\"tightness\"), dyspnea on exertion and near-syncope  Negative for leg swelling, orthopnea, palpitations and syncope  Respiratory: Negative for cough, shortness of breath, sleep disturbances due to breathing and sputum production  Gastrointestinal: Negative for bloating, nausea and vomiting  Neurological: Negative for dizziness, light-headedness and weakness  Psychiatric/Behavioral: Negative for altered mental status  All other systems reviewed and are negative      Historical Information   Past Medical History:   Diagnosis Date " • COPD (chronic obstructive pulmonary disease) (HCC)    • Disease of thyroid gland    • Diverticulitis     15 years ago     History reviewed  No pertinent surgical history  Social History     Substance and Sexual Activity   Alcohol Use No     Social History     Substance and Sexual Activity   Drug Use No     Social History     Tobacco Use   Smoking Status Every Day   • Packs/day: 1 00   • Types: Cigarettes   Smokeless Tobacco Never     Family History: History reviewed  No pertinent family history  Meds/Allergies   all current active meds have been reviewed and current meds:   Current Facility-Administered Medications   Medication Dose Route Frequency   • acetaminophen (TYLENOL) tablet 650 mg  650 mg Oral Q6H PRN   • albuterol (PROVENTIL HFA,VENTOLIN HFA) inhaler 2 puff  2 puff Inhalation Q4H PRN   • aspirin chewable tablet 81 mg  81 mg Oral Daily   • enoxaparin (LOVENOX) subcutaneous injection 40 mg  40 mg Subcutaneous Daily   • levothyroxine tablet 125 mcg  125 mcg Oral Early Morning   • morphine injection 2 mg  2 mg Intravenous Q4H PRN   • nicotine (NICODERM CQ) 21 mg/24 hr TD 24 hr patch 1 patch  1 patch Transdermal Daily   • nitroglycerin (NITROSTAT) SL tablet 0 4 mg  0 4 mg Sublingual Q5 Min PRN   • ondansetron (ZOFRAN) injection 4 mg  4 mg Intravenous Q6H PRN   • oxyCODONE (ROXICODONE) IR tablet 5 mg  5 mg Oral Q4H PRN          No Known Allergies    Objective   Vitals: Blood pressure 143/68, pulse 59, resp  rate 16, weight 81 6 kg (180 lb), SpO2 95 %  , Body mass index is 24 41 kg/m²  ,     Systolic (48UUD), WTL:013 , Min:143 , CHARBEL:064     Diastolic (52RYI), PBW:79, Min:68, Max:87      No intake or output data in the 24 hours ending 04/20/23 1511  Wt Readings from Last 3 Encounters:   04/20/23 81 6 kg (180 lb)   05/01/21 81 6 kg (180 lb)   09/19/17 81 6 kg (180 lb)     Invasive Devices     Peripheral Intravenous Line  Duration           Peripheral IV 05/01/21 Right Hand 719 days    Peripheral IV 04/20/23 Right;Ventral (anterior) Forearm <1 day              Physical Exam  Vitals reviewed  Constitutional:       General: He is not in acute distress  Neck:      Vascular: No hepatojugular reflux or JVD  Cardiovascular:      Rate and Rhythm: Regular rhythm  Bradycardia present  Heart sounds: Normal heart sounds  No murmur heard  No friction rub  No gallop  Pulmonary:      Effort: Pulmonary effort is normal  No respiratory distress  Breath sounds: No rales  Abdominal:      General: Bowel sounds are normal  There is no distension  Palpations: Abdomen is soft  Tenderness: There is no abdominal tenderness  Musculoskeletal:         General: No tenderness  Normal range of motion  Cervical back: Neck supple  Right lower leg: No edema  Left lower leg: No edema  Skin:     General: Skin is warm and dry  Capillary Refill: Capillary refill takes less than 2 seconds  Findings: No erythema  Neurological:      Mental Status: He is alert and oriented to person, place, and time     Psychiatric:         Mood and Affect: Mood normal        LABORATORY RESULTS:      CBC with diff:   Results from last 7 days   Lab Units 04/20/23  1149   WBC Thousand/uL 8 09   HEMOGLOBIN g/dL 15 9   HEMATOCRIT % 49 4*   MCV fL 94   PLATELETS Thousands/uL 236   MCH pg 30 3   MCHC g/dL 32 2   RDW % 12 7   MPV fL 9 6   NRBC AUTO /100 WBCs 0     CMP:  Results from last 7 days   Lab Units 04/20/23  1149   POTASSIUM mmol/L 4 0   CHLORIDE mmol/L 105   CO2 mmol/L 28   BUN mg/dL 17   CREATININE mg/dL 1 11   CALCIUM mg/dL 9 4   AST U/L 28   ALT U/L 39   ALK PHOS U/L 72   EGFR ml/min/1 73sq m 69     BMP:  Results from last 7 days   Lab Units 04/20/23  1149   POTASSIUM mmol/L 4 0   CHLORIDE mmol/L 105   CO2 mmol/L 28   BUN mg/dL 17   CREATININE mg/dL 1 11   CALCIUM mg/dL 9 4       Lab Results   Component Value Date    CREATININE 1 11 04/20/2023    CREATININE 1 02 05/01/2021    CREATININE 1 03 12/20/2016 Imaging: I have personally reviewed pertinent reports  and I have personally reviewed pertinent films in PACS  XR chest 2 views    Result Date: 4/20/2023  Narrative: CHEST INDICATION:   CP  COMPARISON:  9/19/2017 EXAM PERFORMED/VIEWS:  XR CHEST PA & LATERAL  The frontal view was performed utilizing dual energy radiographic technique  FINDINGS: Cardiomediastinal silhouette appears unremarkable  The lungs are clear  No pneumothorax or pleural effusion  Osseous structures appear within normal limits for patient age  Impression: No acute cardiopulmonary disease  Workstation performed: [id number]     Counseling / Coordination of Care  Total floor / unit time spent today 45 minutes  Greater than 50% of total time was spent with the patient and / or family counseling and / or coordination of care  A description of the counseling / coordination of care: Review of history, current assessment, development of a plan  Code Status: Level 1 - Full Code    ** Please Note: Dragon 360 Dictation voice to text software may have been used in the creation of this document   **

## 2023-04-20 NOTE — ASSESSMENT & PLAN NOTE
Patient was on statin for the past 5-month however he stopped taking it 4 days ago due to muscle ache  Check fasting lipid profile

## 2023-04-20 NOTE — ASSESSMENT & PLAN NOTE
Patient presented with symptoms of dyspnea on exertion, left-sided exertional chest pain and mild dizziness  EKG without acute ischemic changes  negative initial troponin  Negative nuclear stress test in June 2022  Rule out unstable angina/cardiac ischemia  Following serial troponin  Monitor on telemetry  Start aspirin  Consult cardiology for further management  Check cardiac echo

## 2023-04-20 NOTE — ED PROVIDER NOTES
Final Diagnoses:     1  Chest pain: unstable angina      ED Course as of 04/20/23 1434   Thu Apr 20, 2023   1201 Procedure Note: EKG  Date/Time: 04/20/23 12:01 PM   Interpreted by: REGAN Vazquez   Indications / Diagnosis: CP  ECG reviewed by me, the ED Provider: yes   The EKG demonstrates:  Rhythm: 64 normal sinus  Intervals: normal intervals; LA hypertrophy likely   Axis: normal axis  QRS/Blocks: normal QRS  ST Changes: No acute ST Changes, no STD/GA  SImilar to prevoius EKG from  Dec 2013     1232 POCUS Cardiac/IVC + POCUS Lung:  - transthoracic echocardiogram was performed at bedside by myself   - Images collected were adequate quality    - This was a technically difficult study due to lung interference  - Apical, parasternal, subcostal views were obtained/attempted  - The main purpose of this study was to r/o obvious pathology requiring emergent intervention as in summary    - Many views/images obtained for educational reasons    - Findings: There was no obvious pericardial effusion:   LV function grossly normal appearing  EPSS 0    MAPSE 15    E/A Ratio 1 23     E' 8    E/E' Ratio 6 29    RV function grossly normal appearing  IVC was IVC < 2 1cm; >50% compression w/ sniff likely RAP 3 mmHg     IVC Max: 15     IVC Min:6     CI: 58%    TAPSE 2 3 (>17 normal; <17 5mm 87% sensitive and 91% specific for RV dysfunction)    RV Wall Thickness: mm (Chronic RV free wall thickening is > 10 mm)    S' = 11 (normal 10+)   Lungs: There was no obvious pleural effusion  B-lines were not present anteriorly bilaterally at upper BLUE-point (3+ B-lines in 2+ fields w/ concern for HF/Cardiogenic pulmonary edema sensitivity 85-94%, specificity 92% LR+ 7 4-12; LR- 0 06-0 16)    Bilateral anterior lung sliding bilaterally present at upper BLUE-point (no pneumothorax, sen 91%; spec 98%)   Valves: There was no obvious MR regurgitation  There was no obvious TR regurgitation      There was no obvious AR regurgitation    There was no obvious LA dilation  There was no obvious RA dilation  Summary:   - There were no obvious B-lines  - There was no obvious anterior pneumothorax  - There was no large pericardial effusion    - Small IVC  1246 hs TnI 0hr: 3   26 I had a long conversation (in front of nurse) with the patient + wife  I stated that his story is [place] concerning for ACS / UA  I think that he should stay in the hospital  I discussed this with an on-call cardiologist who agrees it wouldn't be unreasonable  I disucssed my concerned for that delay in care for heart attack, he might need a catherization  The patient absolutely doesn't want to stay   Wife understands and tried to convince him to stya, he still refused  He re-interated repeatedly that if something happens, he'll return  I explained it might be too late  Patient still doesn't want to stay in the hospital     [phone] to stay now  1430 Will admit  1433 Discussed with sLIM, inpatient / tele     Nursing Triage:     Chief Complaint   Patient presents with   • Chest Pain     Pt has been having chest pain for about one month  Denies any radiation  +SOB with exertion   -nausea/vomiting/diarrhea/diaphoresis  Noted a moment where he felt faint while mowing the grass  HPI:   This is a 74 y o  male presenting for evaluation of CP  The patient for the past few weeks has been having exertioanl CP with associated SOB  No f/ch  Today felt a little bit worse than previosu days so came in for evaluation  +LH at times  No diaphoresis  Denies diarrhea/constipation  No new medications  Because it was interrupting his gardening, he came in for evaluation  RIGHT arm pain  No jaw or back pain  Denies any urinary tract infection symptoms (burning, itching, pain, blood, frequency)  Denies any upper respiratory tract infection symptoms (cough, congestion, rhinorrhea, sore throat)     Eating/drinking normally  No meds trialed  Had a stress test maybe 1 year ago that was normal      ASSESSMENT + PLAN:     - Given patient's concerns, will do a cardiac workup  - Will do an EKG for arrythmia, strain; troponin for same as per protocol for evaluation of ACS  - CBC for anemia; CMP for kidney function and electrolytes  - Will check CXR for pneumonia, PTX, fluid overload  - Will do POCUS Cardiac to evaluate for pericardial effusion  HEART score:  History 2=Highly suspicious   ECG 1=Nonspecific repolarization disturbance   Age 2= > 65 years   Risk Factors 1= 1 or 2 risk factors   Troponin 0= Less than or equal to 12 ng/L   Total 6   - Disposition per workup  Past Medical History:   Diagnosis Date   • COPD (chronic obstructive pulmonary disease) ([facility] [address] )    • Disease of thyroid gland    • Diverticulitis     15 years ago     Physical:   General: VSS, NAD, awake, alert  Well-nourished, well-developed  Appears stated age  Speaking normally in full sentences  Head: Normocephalic, atraumatic, nontender  Eyes: PERRL, EOM-I  No diplopia  No hyphema  No subconjunctival hemorrhages  Symmetrical lids  ENT: Atraumatic external nose and ears  MMM  No malocclusion  No stridor  Normal phonation  No drooling  Normal swallowing  Neck: Symmetric, trachea midline  No JVD  CV: RRR  +S1/S2  No murmurs or gallops  Peripheral pulses +2 throughout  No chest wall tenderness  Lungs:   Unlabored No retractions  CTAB, lungs sounds equal bilateral    No tachypnea  Abd: +BS, soft, NT/ND    MSK:   FROM   Back:   No rashes  Skin: Dry, intact  Neuro: AAOx3, GCS 15, CN II-XII grossly intact  Motor grossly intact    Psychiatric/Behavioral: Appropriate mood and affect   Exam: deferred    Vitals:    04/20/23 1107 04/20/23 1108 04/20/23 1353   BP: 163/87  143/68   BP Location:   Right arm   Pulse: 69  59   Resp: 18  16   TempSrc:  Oral    SpO2: 98%  95%   Weight: 81 6 kg (180 lb)       - There are no obvious limitations to social determinants of care  - Nursing note reviewed  - Vitals reviewed  - Orders placed by myself and/or advanced practitioner / resident     - Previous chart was reviewed  - No language barrier    - History obtained from wife patient  - There are no limitations to the history obtained:     Past Medical:    has a past medical history of COPD (chronic obstructive pulmonary disease) ([address] ), Disease of thyroid gland, and Diverticulitis  Past Surgical:    has no past surgical history on file  Social:     Social History     Substance and Sexual Activity   Alcohol Use No     Social History     Tobacco Use   Smoking Status Every Day   • Packs/day: 1 00   • Types: Cigarettes   Smokeless Tobacco Never     Social History     Substance and Sexual Activity   Drug Use No       Echo:   No results found for this or any previous visit  No results found for this or any previous visit  Cath:    No results found for this or any previous visit  Code Status: Prior  Advance Directive and Living Will:      Power of :    POLST:    Medications   sodium chloride 0 9 % bolus 1,000 mL (1,000 mL Intravenous New Bag 4/20/23 1245)   ketorolac (TORADOL) injection 15 mg (15 mg Intravenous Given 4/20/23 1246)     XR chest 2 views   ED Interpretation   Hyperinflated / COPD  No obvious pneumonia        Orders Placed This Encounter   Procedures   • XR chest 2 views   • CBC and differential   • Comprehensive metabolic panel   • HS Troponin 0hr (reflex protocol)   • HS Troponin I 2hr   • HS Troponin I 4hr   • Notify physician of an increase in chest pain, symptomatic hypotension, a change in cardiac rhythm, or an O2 saturation of less than 90%  • Notify physician immediately if patient has persistent Chest Pain     • Insert peripheral IV   • Continuous cardiac monitoring   • Continuous pulse oximetry   • ECG 12 lead   • ECG 12 lead   • INPATIENT ADMISSION     Labs Reviewed   CBC AND DIFFERENTIAL - Abnormal       Result Value "Ref Range Status    WBC 8 09  4 31 - 10 16 Thousand/uL Final    RBC 5 24  3 88 - 5 62 Million/uL Final    Hemoglobin 15 9  12 0 - 17 0 g/dL Final    Hematocrit 49 4 (*) 36 5 - 49 3 % Final    MCV 94  82 - 98 fL Final    MCH 30 3  26 8 - 34 3 pg Final    MCHC 32 2  31 4 - 37 4 g/dL Final    RDW 12 7  11 6 - 15 1 % Final    MPV 9 6  8 9 - 12 7 fL Final    Platelets 709  462 - 390 Thousands/uL Final    nRBC 0  /100 WBCs Final    Neutrophils Relative 66  43 - 75 % Final    Immat GRANS % 0  0 - 2 % Final    Lymphocytes Relative 19  14 - 44 % Final    Monocytes Relative 12  4 - 12 % Final    Eosinophils Relative 3  0 - 6 % Final    Basophils Relative 0  0 - 1 % Final    Neutrophils Absolute 5 31  1 85 - 7 62 Thousands/µL Final    Immature Grans Absolute 0 02  0 00 - 0 20 Thousand/uL Final    Lymphocytes Absolute 1 54  0 60 - 4 47 Thousands/µL Final    Monocytes Absolute 0 95  0 17 - 1 22 Thousand/µL Final    Eosinophils Absolute 0 24  0 00 - 0 61 Thousand/µL Final    Basophils Absolute 0 03  0 00 - 0 10 Thousands/µL Final   HS TROPONIN I 0HR - Normal    hs TnI 0hr 3  \"Refer to ACS Flowchart\"- see link ng/L Final    Comment:                                              Initial (time 0) result  If >=50 ng/L, Myocardial injury suggested ;  Type of myocardial injury and treatment strategy  to be determined  If 5-49 ng/L, a delta result at 2 hours and or 4 hours will be needed to further evaluate  If <4 ng/L, and chest pain has been >3 hours since onset, patient may qualify for discharge based on the HEART score in the ED  If <5 ng/L and <3hours since onset of chest pain, a delta result at 2 hours will be needed to further evaluate  HS Troponin 99th Percentile URL of a Health Population=12 ng/L with a 95% Confidence Interval of 8-18 ng/L  Second Troponin (time 2 hours)  If calculated delta >= 20 ng/L,  Myocardial injury suggested ; Type of myocardial injury and treatment strategy to be determined    If 5-49 ng/L and " the calculated delta is 5-19 ng/L, consult medical service for evaluation  Continue evaluation for ischemia on ecg and other possible etiology and repeat hs troponin at 4 hours  If delta is <5 ng/L at 2 hours, consider discharge based on risk stratification via the HEART score (if in ED), or HOLLY risk score in IP/Observation  HS Troponin 99th Percentile URL of a Health Population=12 ng/L with a 95% Confidence Interval of 8-18 ng/L  COMPREHENSIVE METABOLIC PANEL    Sodium 132  135 - 147 mmol/L Final    Potassium 4 0  3 5 - 5 3 mmol/L Final    Chloride 105  96 - 108 mmol/L Final    CO2 28  21 - 32 mmol/L Final    ANION GAP 4  4 - 13 mmol/L Final    BUN 17  5 - 25 mg/dL Final    Creatinine 1 11  0 60 - 1 30 mg/dL Final    Comment: Standardized to IDMS reference method    Glucose 118  65 - 140 mg/dL Final    Comment: If the patient is fasting, the ADA then defines impaired fasting glucose as > 100 mg/dL and diabetes as > or equal to 123 mg/dL  Specimen collection should occur prior to Sulfasalazine administration due to the potential for falsely depressed results  Specimen collection should occur prior to Sulfapyridine administration due to the potential for falsely elevated results  Calcium 9 4  8 3 - 10 1 mg/dL Final    AST 28  5 - 45 U/L Final    Comment: Specimen collection should occur prior to Sulfasalazine administration due to the potential for falsely depressed results  ALT 39  12 - 78 U/L Final    Comment: Specimen collection should occur prior to Sulfasalazine and/or Sulfapyridine administration due to the potential for falsely depressed results  Alkaline Phosphatase 72  46 - 116 U/L Final    Total Protein 7 3  6 4 - 8 4 g/dL Final    Albumin 4 2  3 5 - 5 0 g/dL Final    Total Bilirubin 0 70  0 20 - 1 00 mg/dL Final    Comment: Use of this assay is not recommended for patients undergoing treatment with eltrombopag due to the potential for falsely elevated results      eGFR 69  ml/min/1 73sq m Final    Narrative:     National Kidney Disease Foundation guidelines for Chronic Kidney Disease (CKD):   •  Stage 1 with normal or high GFR (GFR > 90 mL/min/1 73 square meters)  •  Stage 2 Mild CKD (GFR = 60-89 mL/min/1 73 square meters)  •  Stage 3A Moderate CKD (GFR = 45-59 mL/min/1 73 square meters)  •  Stage 3B Moderate CKD (GFR = 30-44 mL/min/1 73 square meters)  •  Stage 4 Severe CKD (GFR = 15-29 mL/min/1 73 square meters)  •  Stage 5 End Stage CKD (GFR <15 mL/min/1 73 square meters)  Note: GFR calculation is accurate only with a steady state creatinine   HS TROPONIN I 2HR   HS TROPONIN I 4HR     Time reflects when diagnosis was documented in both MDM as applicable and the Disposition within this note     Time User Action Codes Description Comment    4/20/2023  2:33 PM Naida Dakins Add [R07 9] Chest pain     4/20/2023  2:33 PM Naida Dakins Modify [R07 9] Chest pain: unstable angina       ED Disposition     ED Disposition   Admit    Condition   Stable    Date/Time   Thu Apr 20, 2023  2:33 PM    Comment   Case was discussed with dad and the patient's admission status was agreed to be Admission Status: inpatient status to the service of Dr Mary Kay Brand   Follow-up Information    None       Patient's Medications   Discharge Prescriptions    No medications on file     No discharge procedures on file  Prior to Admission Medications   Prescriptions Last Dose Informant Patient Reported? Taking?    [id number] (DAVINA-SELTZER PLUS COLD & COUGH) 5-2- MG CAPS   Yes No   Sig: Take by mouth   albuterol (PROVENTIL HFA,VENTOLIN HFA) 90 mcg/act inhaler   No No   Sig: Inhale 2 puffs every 4 (four) hours as needed for wheezing   levothyroxine 125 mcg tablet   Yes No   Sig: Take 125 mcg by mouth daily   methocarbamol (ROBAXIN) 750 mg tablet   No No   Sig: Take 1 tablet (750 mg total) by mouth 3 (three) times a day for 3 days      Facility-Administered Medications: None "          Portions of the record may have been created with voice recognition software  Occasional wrong word or \"sound a like\" substitutions may have occurred due to the inherent limitations of voice recognition software  Read the chart carefully and recognize, using context, where substitutions have occurred      Electronically signed by:  Thea Barrios MD  04/20/23 1861    "

## 2023-04-21 VITALS
RESPIRATION RATE: 17 BRPM | DIASTOLIC BLOOD PRESSURE: 82 MMHG | TEMPERATURE: 97.8 F | OXYGEN SATURATION: 95 % | HEIGHT: 72 IN | WEIGHT: 180 LBS | BODY MASS INDEX: 24.38 KG/M2 | HEART RATE: 62 BPM | SYSTOLIC BLOOD PRESSURE: 141 MMHG

## 2023-04-21 PROBLEM — R06.09 DYSPNEA ON EXERTION: Status: RESOLVED | Noted: 2023-04-20 | Resolved: 2023-04-21

## 2023-04-21 LAB
ANION GAP SERPL CALCULATED.3IONS-SCNC: 3 MMOL/L (ref 4–13)
ATRIAL RATE: 65 BPM
BUN SERPL-MCNC: 22 MG/DL (ref 5–25)
CALCIUM SERPL-MCNC: 8.5 MG/DL (ref 8.3–10.1)
CHLORIDE SERPL-SCNC: 114 MMOL/L (ref 96–108)
CO2 SERPL-SCNC: 25 MMOL/L (ref 21–32)
CREAT SERPL-MCNC: 1.22 MG/DL (ref 0.6–1.3)
EST. AVERAGE GLUCOSE BLD GHB EST-MCNC: 103 MG/DL
GFR SERPL CREATININE-BSD FRML MDRD: 61 ML/MIN/1.73SQ M
GLUCOSE SERPL-MCNC: 90 MG/DL (ref 65–140)
HBA1C MFR BLD: 5.2 %
P AXIS: 85 DEGREES
POTASSIUM SERPL-SCNC: 4.1 MMOL/L (ref 3.5–5.3)
PR INTERVAL: 192 MS
QRS AXIS: -66 DEGREES
QRSD INTERVAL: 106 MS
QT INTERVAL: 402 MS
QTC INTERVAL: 418 MS
SODIUM SERPL-SCNC: 142 MMOL/L (ref 135–147)
T WAVE AXIS: 57 DEGREES
TSH SERPL DL<=0.05 MIU/L-ACNC: 0.82 UIU/ML (ref 0.45–4.5)
VENTRICULAR RATE: 65 BPM

## 2023-04-21 PROCEDURE — B2111ZZ FLUOROSCOPY OF MULTIPLE CORONARY ARTERIES USING LOW OSMOLAR CONTRAST: ICD-10-PCS | Performed by: INTERNAL MEDICINE

## 2023-04-21 RX ORDER — NITROGLYCERIN 20 MG/100ML
INJECTION INTRAVENOUS CODE/TRAUMA/SEDATION MEDICATION
Status: DISCONTINUED | OUTPATIENT
Start: 2023-04-21 | End: 2023-04-21 | Stop reason: HOSPADM

## 2023-04-21 RX ORDER — FENTANYL CITRATE 50 UG/ML
INJECTION, SOLUTION INTRAMUSCULAR; INTRAVENOUS CODE/TRAUMA/SEDATION MEDICATION
Status: DISCONTINUED | OUTPATIENT
Start: 2023-04-21 | End: 2023-04-21 | Stop reason: HOSPADM

## 2023-04-21 RX ORDER — MIDAZOLAM HYDROCHLORIDE 2 MG/2ML
INJECTION, SOLUTION INTRAMUSCULAR; INTRAVENOUS CODE/TRAUMA/SEDATION MEDICATION
Status: DISCONTINUED | OUTPATIENT
Start: 2023-04-21 | End: 2023-04-21 | Stop reason: HOSPADM

## 2023-04-21 RX ORDER — SODIUM CHLORIDE 9 MG/ML
150 INJECTION, SOLUTION INTRAVENOUS CONTINUOUS
Status: DISPENSED | OUTPATIENT
Start: 2023-04-21 | End: 2023-04-21

## 2023-04-21 RX ORDER — LIDOCAINE HYDROCHLORIDE 10 MG/ML
INJECTION, SOLUTION EPIDURAL; INFILTRATION; INTRACAUDAL; PERINEURAL CODE/TRAUMA/SEDATION MEDICATION
Status: DISCONTINUED | OUTPATIENT
Start: 2023-04-21 | End: 2023-04-21 | Stop reason: HOSPADM

## 2023-04-21 RX ORDER — VERAPAMIL HYDROCHLORIDE 2.5 MG/ML
INJECTION, SOLUTION INTRAVENOUS CODE/TRAUMA/SEDATION MEDICATION
Status: DISCONTINUED | OUTPATIENT
Start: 2023-04-21 | End: 2023-04-21 | Stop reason: HOSPADM

## 2023-04-21 RX ORDER — HEPARIN SODIUM 1000 [USP'U]/ML
INJECTION, SOLUTION INTRAVENOUS; SUBCUTANEOUS CODE/TRAUMA/SEDATION MEDICATION
Status: DISCONTINUED | OUTPATIENT
Start: 2023-04-21 | End: 2023-04-21 | Stop reason: HOSPADM

## 2023-04-21 RX ADMIN — LEVOTHYROXINE SODIUM 125 MCG: 125 TABLET ORAL at 05:24

## 2023-04-21 RX ADMIN — SODIUM CHLORIDE 150 ML/HR: 0.9 INJECTION, SOLUTION INTRAVENOUS at 12:00

## 2023-04-21 RX ADMIN — NICOTINE 1 PATCH: 21 PATCH, EXTENDED RELEASE TRANSDERMAL at 08:07

## 2023-04-21 RX ADMIN — SODIUM CHLORIDE 75 ML/HR: 0.9 INJECTION, SOLUTION INTRAVENOUS at 05:24

## 2023-04-21 RX ADMIN — ASPIRIN 324 MG: 81 TABLET, CHEWABLE ORAL at 05:24

## 2023-04-21 RX ADMIN — TICAGRELOR 90 MG: 90 TABLET ORAL at 08:07

## 2023-04-21 RX ADMIN — ENOXAPARIN SODIUM 40 MG: 40 INJECTION SUBCUTANEOUS at 08:07

## 2023-04-21 RX ADMIN — ATORVASTATIN CALCIUM 20 MG: 20 TABLET, FILM COATED ORAL at 17:54

## 2023-04-21 NOTE — PROGRESS NOTES
General Cardiology   Progress Note -  [facility] One   Anupam Lagos 74 y o  male MRN: [id number]  Unit/Bed#: -01 Encounter: [id number]    Assessment     1  BRAVO, possible unstable angina  Exertional chest tightness and SOB for at least the past three weeks, episode occurred at rest yesterday morning  Hs troponin negative  ECG- NSR  Exercise nuclear stress test 06/2022 without ischemia, normal LV function  Echo 4/20 showed normal LV and RV function with mild MR  Risk factors for CAD include dyslipidemia and tobacco abuse  BP was mildly elevated on admission but no formal dx of HTN     2  Hyperlipidemia- lipid panel , TG 88, HDL 64, LDL 99 (improved since October with atorvastatin 10 mg daily)  3  COPD  4  Hypothyroidism- TSH WNL  5  Tobacco abuse- 1 ppd, sometimes more since retiring      Plan  1  Continue aspirin, ticagrelor, and increased atorvastatin dose-20 mg daily  2  Hold off on beta blocker for now due to baseline bradycardia in the 50s-60s  3  Cardiac catheterization later today to define coronary anatomy  4  Smoking cessation    Subjective  No symptoms overnight  Review of Systems   Constitutional: Negative for chills, malaise/fatigue and weight gain  Cardiovascular: Negative for chest pain, dyspnea on exertion, leg swelling, orthopnea, palpitations and syncope  Respiratory: Negative for cough, shortness of breath, sleep disturbances due to breathing and sputum production  Gastrointestinal: Negative for bloating, nausea and vomiting  Neurological: Negative for dizziness, light-headedness and weakness  Psychiatric/Behavioral: Negative for altered mental status  All other systems reviewed and are negative  Objective:   Vitals: Blood pressure 127/78, pulse 69, temperature 98 2 °F (36 8 °C), temperature source Oral, resp  rate 17, height 6' (1 829 m), weight 81 6 kg (180 lb), SpO2 94 %  , Body mass index is 24 41 kg/m²  ,     Systolic (83PSX), YNB:647 , Min:127 , Max:163 Diastolic (75DOT), JESS:66, Min:68, Max:87    No intake or output data in the 24 hours ending 04/21/23 1024  Wt Readings from Last 3 Encounters:   04/20/23 81 6 kg (180 lb)   05/01/21 81 6 kg (180 lb)   09/19/17 81 6 kg (180 lb)     Telemetry Review: No significant arrhythmias seen on telemetry review  Physical Exam  Vitals reviewed  Constitutional:       General: He is not in acute distress  Neck:      Vascular: No hepatojugular reflux or JVD  Cardiovascular:      Rate and Rhythm: Normal rate and regular rhythm  Pulses: Normal pulses  Heart sounds: Normal heart sounds  No murmur heard  No friction rub  No gallop  Pulmonary:      Effort: Pulmonary effort is normal  No respiratory distress  Breath sounds: No rales  Comments: Decreased at bases, room air  Abdominal:      General: Bowel sounds are normal  There is no distension  Palpations: Abdomen is soft  Tenderness: There is no abdominal tenderness  Musculoskeletal:         General: No tenderness  Normal range of motion  Cervical back: Neck supple  Right lower leg: No edema  Left lower leg: No edema  Skin:     General: Skin is warm and dry  Capillary Refill: Capillary refill takes less than 2 seconds  Findings: No erythema  Neurological:      Mental Status: He is alert and oriented to person, place, and time     Psychiatric:         Mood and Affect: Mood normal      LABORATORY RESULTS      CBC with diff:   Results from last 7 days   Lab Units 04/20/23  1149   WBC Thousand/uL 8 09   HEMOGLOBIN g/dL 15 9   HEMATOCRIT % 49 4*   MCV fL 94   PLATELETS Thousands/uL 236   MCH pg 30 3   MCHC g/dL 32 2   RDW % 12 7   MPV fL 9 6   NRBC AUTO /100 WBCs 0       CMP:  Results from last 7 days   Lab Units 04/21/23  0454 04/20/23  1149   POTASSIUM mmol/L 4 1 4 0   CHLORIDE mmol/L 114* 105   CO2 mmol/L 25 28   BUN mg/dL 22 17   CREATININE mg/dL 1 22 1 11   CALCIUM mg/dL 8 5 9 4   AST U/L  --  28   ALT U/L --  39   ALK PHOS U/L  --  72   EGFR ml/min/1 73sq m 61 69       BMP:  Results from last 7 days   Lab Units 04/21/23  0454 04/20/23  1149   POTASSIUM mmol/L 4 1 4 0   CHLORIDE mmol/L 114* 105   CO2 mmol/L 25 28   BUN mg/dL 22 17   CREATININE mg/dL 1 22 1 11   CALCIUM mg/dL 8 5 9 4       Lab Results   Component Value Date    CREATININE 1 22 04/21/2023    CREATININE 1 11 04/20/2023    CREATININE 1 02 05/01/2021       No results found for: NTBNP                      Results from last 7 days   Lab Units 04/21/23  0454   TSH 3RD GENERATON uIU/mL 0 817             Lipid Profile:   No results found for: CHOL  Lab Results   Component Value Date    HDL 64 04/20/2023     Lab Results   Component Value Date    LDLCALC 101 (H) 04/20/2023     Lab Results   Component Value Date    TRIG 88 04/20/2023     Meds/Allergies   all current active meds have been reviewed and current meds:   Current Facility-Administered Medications   Medication Dose Route Frequency   • acetaminophen (TYLENOL) tablet 650 mg  650 mg Oral Q6H PRN   • albuterol (PROVENTIL HFA,VENTOLIN HFA) inhaler 2 puff  2 puff Inhalation Q4H PRN   • [START ON 4/22/2023] aspirin chewable tablet 81 mg  81 mg Oral Daily   • atorvastatin (LIPITOR) tablet 20 mg  20 mg Oral Daily With Dinner   • enoxaparin (LOVENOX) subcutaneous injection 40 mg  40 mg Subcutaneous Daily   • levothyroxine tablet 125 mcg  125 mcg Oral Early Morning   • morphine injection 2 mg  2 mg Intravenous Q4H PRN   • nicotine (NICODERM CQ) 21 mg/24 hr TD 24 hr patch 1 patch  1 patch Transdermal Daily   • nitroglycerin (NITROSTAT) SL tablet 0 4 mg  0 4 mg Sublingual Q5 Min PRN   • ondansetron (ZOFRAN) injection 4 mg  4 mg Intravenous Q6H PRN   • oxyCODONE (ROXICODONE) IR tablet 5 mg  5 mg Oral Q4H PRN   • sodium chloride 0 9 % infusion  75 mL/hr Intravenous Continuous   • ticagrelor (BRILINTA) tablet 90 mg  90 mg Oral Q12H [address]     Medications Prior to Admission   Medication   • albuterol (PROVENTIL HFA,VENTOLIN HFA) 90 mcg/act inhaler   • atorvastatin (LIPITOR) 10 mg tablet   • levothyroxine 125 mcg tablet   • methocarbamol (ROBAXIN) 750 mg tablet   • [id number]-[id number]-AY-APAP 5-2- MG CAPS     sodium chloride, 75 mL/hr, Last Rate: 75 mL/hr (04/21/23 0524)    Counseling / Coordination of Care  Total floor / unit time spent today 20 minutes  Greater than 50% of total time was spent with the patient and / or family counseling and / or coordination of care  ** Please Note: Dragon 360 Dictation voice to text software may have been used in the creation of this document   **

## 2023-04-21 NOTE — UTILIZATION REVIEW
"NOTIFICATION OF INPATIENT ADMISSION   AUTHORIZATION REQUEST   SERVICING FACILITY:   [facility]  Address: [address] [place] [address], [address] [place] [address]  Tax ID: [id number]  NPI: [id number] ATTENDING PROVIDER:  Attending Name and NPI#: Aicha Pollard [[id number]]  Address: [address] [place] [address], [address] [place] [address]  Phone: [phone]   ADMISSION INFORMATION:  Place of Service: Inpatient [address] [facility]  [address]  Place of Service Code: 21  Inpatient Admission Date/Time: 4/20/23  2:34 PM  Discharge Date/Time: No discharge date for patient encounter  Admitting Diagnosis Code/Description:  Chest pain [R07 9]     UTILIZATION REVIEW CONTACT:  Theotis Code" Akosua Lester, Utilization   Network Utilization Review Department  Phone: [phone]  Fax: [phone]  Email: [email or web address]  Contact for approvals/pending authorizations, clinical reviews, and discharge  PHYSICIAN ADVISORY SERVICES:  Medical Necessity Denial & Bwwt-md-Vefi Review  Phone: [phone]  Fax: [phone]  Email: [email or web address]       "

## 2023-04-21 NOTE — UTILIZATION REVIEW
Initial Clinical Review    Admission: Date/Time/Statement:   Admission Orders (From admission, onward)     Ordered        04/20/23 1434  [address] [facility] [place]  Once                      Orders Placed This Encounter   Procedures   • INPATIENT ADMISSION     Standing Status:   Standing     Number of Occurrences:   1     Order Specific Question:   Level of Care     Answer:   Med Surg [16]     Order Specific Question:   Estimated length of stay     Answer:   More than 2 Midnights     Order Specific Question:   Certification     Answer:   I certify that inpatient services are medically necessary for this patient for a duration of greater than two midnights  See H&P and MD Progress Notes for additional information about the patient's course of treatment  ED Arrival Information     Expected   -    Arrival   4/20/2023 11:02    Acuity   Urgent            Means of arrival   Walk-In    Escorted by   Self    Service   Hospitalist    Admission type   Emergency            Arrival complaint   chest pain           Chief Complaint   Patient presents with   • Chest Pain     Pt has been having chest pain for about one month  Denies any radiation  +SOB with exertion   -nausea/vomiting/diarrhea/diaphoresis  Noted a moment where he felt faint while mowing the grass  Initial Presentation: 74 y o  male pmh COPD, smokes 1 PPD, Grave's disease, HLD  to ED presents self stating BRAVO  w/ SOB for at least the past three weeks w dizziness; started while mowing grass   States incr fatigue w chest tightness w heavy exertin; in AM w episode of chest tightness w SOB  at rest    Reports stopped taking statin 4 days prior due to muscle weakness    EXAM:  ekg NSR wo ischemia, neg TROPs, mildly elevated BP  Inpatient admission due to BRAVO, concern for possible angina     Consult Cardiology for recommendations, rule out ischemia /unstable stable angina tele, echo   Fasting lipid panel, obtain thyroid study, cont levothyroxine, nicotine patch, cont Bronchodilator    Cardiology  Telemetry reviewed personally: NSR and sinus bradycardia  BRAVO w   unstable angina  Cont ASA, resume atorvastatin; hold OFF BB for now due to bradycardia  ECHO, coronary angiography in AM, NPO @ [facility], smoking cessation, follow TSH  Date:  4/21/2023   Day 2:   Cardiology  Hs troponin negative  ECG- NSR  Exercise nuclear stress test 06/2022 without ischemia, normal LV function  Echo 4/20 showed normal LV and RV function with mild MR  Risk factors for CAD include dyslipidemia and tobacco abuse   BP was mildly elevated on admission but no formal dx of HTN      Cont ASA, ticagrelor & increase atorvastatin dose 20 mg daily  Obtain cardiac cath later today;     ED Triage Vitals   Temperature Pulse Respirations Blood Pressure SpO2   04/20/23 1858 04/20/23 1107 04/20/23 1107 04/20/23 1107 04/20/23 1107   97 5 °F (36 4 °C) 69 18 163/87 98 %      Temp Source Heart Rate Source Patient Position - Orthostatic VS BP Location FiO2 (%)   04/20/23 1108 04/20/23 1353 04/20/23 1353 04/20/23 1353 --   Oral Monitor Lying Right arm       Pain Score       04/20/23 1107       5          Wt Readings from Last 1 Encounters:   04/20/23 81 6 kg (180 lb)     Additional Vital Signs:   Date/Time Temp Pulse Resp BP MAP (mmHg) SpO2 Calculated FIO2 (%) - Nasal Cannula Nasal Cannula O2 Flow Rate (L/min) O2 Device Patient Position - Orthostatic VS   04/21/23 12:49:53 97 8 °F (36 6 °C) 62 -- 141/82 102 95 % -- -- -- --   04/21/23 11:31:03 -- -- -- -- -- -- 28 2 L/min Nasal cannula --   04/21/23 1056 -- 67 -- -- -- -- -- -- None (Room air) --   04/21/23 07:45:43 98 2 °F (36 8 °C) 69 -- 127/78 94 94 % -- -- None (Room air) Lying   04/21/23 02:05:34 -- 63 -- 131/76 94 95 % -- -- -- --   04/20/23 22:16:07 98 3 °F (36 8 °C) 72 -- 131/76 94 95 % -- -- -- --   04/20/23 18:58:17 97 5 °F (36 4 °C) 72 17 129/76 94 96 % -- -- -- --   04/20/23 1714 -- 60 16 132/79 99 96 % -- -- None (Room air) Lying   04/20/23 1530 -- 60 -- 143/68 -- -- -- -- -- --   04/20/23 1353 -- 59 16 143/68 98 95 % -- -- None (Room air) Lying   04/20/23 1107 -- 69 18 163/87 -- 98 % -- -- -- --     Weights (last 14 days)    Date/Time Weight Weight Method Height   04/20/23 1530 81 6 kg (180 lb) -- 6' (1 829 m)   04/20/23 1107 81 6 kg (180 lb) Stated        Pertinent Labs/Diagnostic Test Results:   4/21/2023 Cardiac cath=    4/21/2023 Cardiac angiography= Few focal irregularities, otherwise normal coronaries  4/20/2023 ECHO= Left Ventricle: Left ventricular cavity size is normal  Wall thickness is normal  The left ventricular ejection fraction is 60%  Systolic function is normal  Wall motion is normal  Diastolic function is normal   •  Right Ventricle: Right ventricular cavity size is normal  Systolic function is normal   •  Mitral Valve: There is mild regurgitation  4/20 ekg NSR wo acute ischemic changes    XR chest 2 views   ED Interpretation by Garima Hollingsworth MD (04/20 1348)   Hyperinflated / COPD  No obvious pneumonia      Final Result by Urmila Cordoba MD (04/20 1506)      No acute cardiopulmonary disease           Results from last 7 days   Lab Units 04/20/23  1149   WBC Thousand/uL 8 09   HEMOGLOBIN g/dL 15 9   HEMATOCRIT % 49 4*   PLATELETS Thousands/uL 236   NEUTROS ABS Thousands/µL 5 31         Results from last 7 days   Lab Units 04/21/23  0454 04/20/23  1149   SODIUM mmol/L 142 137   POTASSIUM mmol/L 4 1 4 0   CHLORIDE mmol/L 114* 105   CO2 mmol/L 25 28   ANION GAP mmol/L 3* 4   BUN mg/dL 22 17   CREATININE mg/dL 1 22 1 11   EGFR ml/min/1 73sq m 61 69   CALCIUM mg/dL 8 5 9 4     Results from last 7 days   Lab Units 04/20/23  1149   AST U/L 28   ALT U/L 39   ALK PHOS U/L 72   TOTAL PROTEIN g/dL 7 3   ALBUMIN g/dL 4 2   TOTAL BILIRUBIN mg/dL 0 70         Results from last 7 days   Lab Units 04/21/23  0454 04/20/23  1149   GLUCOSE RANDOM mg/dL 90 118         Results from last 7 days   Lab Units 04/20/23  1149   HEMOGLOBIN A1C % 5 2   EAG mg/dl 103     No results found for: BETA-HYDROXYBUTYRATE                   Results from last 7 days   Lab Units 04/20/23  1353 04/20/23  1149   HS TNI 0HR ng/L  --  3   HS TNI 2HR ng/L 2  --    HSTNI D2 ng/L -1  --              Results from last 7 days   Lab Units 04/21/23  0454   TSH 3RD GENERATON uIU/mL 0 817       ED Treatment:   Medication Administration from 04/20/2023 1102 to 04/20/2023 1857       Date/Time Order Dose Route Action     04/20/2023 1245 EDT sodium chloride 0 9 % bolus 1,000 mL 1,000 mL Intravenous New Bag     04/20/2023 1246 EDT ketorolac (TORADOL) injection 15 mg 15 mg Intravenous Given     04/20/2023 1632 EDT nicotine (NICODERM CQ) 21 mg/24 hr TD 24 hr patch 1 patch 1 patch Transdermal Medication Applied     04/20/2023 1632 EDT enoxaparin (LOVENOX) subcutaneous injection 40 mg 40 mg Subcutaneous Given     04/20/2023 1635 EDT atorvastatin (LIPITOR) tablet 20 mg 20 mg Oral Given     04/20/2023 1646 EDT ticagrelor (BRILINTA) tablet 180 mg 180 mg Oral Given        Past Medical History:   Diagnosis Date   • [facility]ch[facility]) ([facility] )    • Disease of thyroid gland    • Diverticulitis     15 years ago     Present on Admission:  **None**      Admitting Diagnosis: Chest pain [R07 9]  Age/Sex: 74 y o  male  Admission Orders:  Tele  Echo  Cardiac cath  SCD    Scheduled Medications:  atorvastatin, 20 mg, Oral, Daily With Dinner  enoxaparin, 40 mg, Subcutaneous, Daily  levothyroxine, 125 mcg, Oral, Early Morning  nicotine, 1 patch, Transdermal, Daily  Continuous IV Infusions:     Medications 04/21   sodium chloride 0 9 % infusion  Rate: 150 mL/hr Dose: 150 mL/hr  Freq: Continuous Route: IV  Indications of Use: IV Hydration  Last Dose: Stopped (04/21/23 1458)  Start: 04/21/23 1200 End: 04/21/23 1459   Admin Instructions:   ** IV Fluids must be on IV PUMP **    1200     1451     1459-D/C'd      sodium chloride 0 9 % infusion  Rate: 75 mL/hr Dose: 75 mL/hr  Freq: Continuous Route: IV  Indications of Use: IV Hydration  Last Dose: 75 mL/hr (04/21/23 0524)  Start: 04/21/23 0600 End: 04/21/23 1123    0524     1123-D/C'd        PRN Meds:  acetaminophen, 650 mg, Oral, Q6H PRN  albuterol, 2 puff, Inhalation, Q4H PRN  morphine injection, 2 mg, Intravenous, Q4H PRN  nitroglycerin, 0 4 mg, Sublingual, Q5 Min PRN  ondansetron, 4 mg, Intravenous, Q6H PRN  oxyCODONE, 5 mg, Oral, Q4H PRN      IP CONSULT TO CARDIOLOGY    Network Utilization Review Department  ATTENTION: Please call with any questions or concerns to [phone] and carefully listen to the prompts so that you are directed to the right person  All voicemails are confidential   Laura Rodriguez all requests for admission clinical reviews, approved or denied determinations and any other requests to dedicated fax number below belonging to the campus where the patient is receiving treatment   List of dedicated fax numbers for the Facilities:  [address] DENIALS (Administrative/Medical Necessity) [phone]   [address] (Maternity/NICU/Pediatrics) [phone]   [address] Qureshi [phone]   Crist[address]balelizabeth Gracia  [phone]   [facility] [address] [place] [id number] [place] [address] [facility] [phone]   [address] [place] [facility] 134 815 [facility] [phone]

## 2023-04-21 NOTE — DISCHARGE SUMMARY
[address] [facility]  Discharge- Carlito Story 1955, 74 y o  male MRN: [id number]  Unit/Bed#: -01 Encounter: [id number]  Primary Care Provider: Romario Cobian MD   Date and time admitted to hospital: 4/20/2023 11:21 AM    * Dyspnea on exertion  Assessment & Plan  Patient presented with symptoms of dyspnea on exertion, left-sided exertional chest pain and mild dizziness  EKG without acute ischemic changes  negative initial troponin  Negative nuclear stress test in June 2022  Rule out unstable angina/cardiac ischemia  Following serial troponin  Monitor on telemetry  Start aspirin  Consult cardiology for further management  Check cardiac echo    Hyperlipidemia  Assessment & Plan  Patient was on statin for the past 5-month however he stopped taking it 4 days ago due to muscle ache  Check fasting lipid profile    Postablative hypothyroidism  Assessment & Plan  Patient with underlying Graves' disease status post ablation  Continue levothyroxine   Check thyroid study      Tobacco abuse  Assessment & Plan  Start nicotine patch    COPD (chronic obstructive pulmonary disease) ([address] )  Assessment & Plan  Without acute exacerbation  Continue bronchodilator      Medical Problems     Resolved Problems  Date Reviewed: 4/21/2023   None       Discharging Physician / Practitioner: ORI Regan  PCP: Romario Cobian MD  Admission Date:   Admission Orders (From admission, onward)     Ordered        04/20/23 1434  [facility]  Once                      Discharge Date: 04/21/23    Consultations During Hospital Stay:  · ***    Procedures Performed:   · ***    Significant Findings / Test Results:   · ***    Incidental Findings:   · ***   · {SLIM Discharge Incidential Findings:34342}    Test Results Pending at Discharge (will require follow up):   · ***     Outpatient Tests Requested:  · ***    Complications:  ***    Reason for Admission: ***    Hospital Course:   Carlito Story is a 74 y o  male patient who originally presented to the hospital on 4/20/2023 due to ***    {Complete this smartphrase if the patient is an inpatient and being discharged earlier than 2 midnights  If this does not apply, please delete this line:65977}    Please see above list of diagnoses and related plan for additional information  Condition at Discharge: {Condition:62220}    Discharge Day Visit / Exam:   Subjective:  ***  Vitals: Blood Pressure: 141/82 (04/21/23 1249)  Pulse: 62 (04/21/23 1249)  Temperature: 97 8 °F (36 6 °C) (04/21/23 1249)  Temp Source: Oral (04/21/23 0745)  Respirations: 17 (04/20/23 1858)  Height: 6' (182 9 cm) (04/20/23 1530)  Weight - Scale: 81 6 kg (180 lb) (04/20/23 1530)  SpO2: 95 % (04/21/23 1249)  Exam:   Physical Exam ***    Discussion with Family: {Family Communication:70507}    Discharge instructions/Information to patient and family:   See after visit summary for information provided to patient and family  Provisions for Follow-Up Care:  See after visit summary for information related to follow-up care and any pertinent home health orders  Disposition:   {Disposition on Discharge:74601}    Planned Readmission: ***     Discharge Statement:  I spent *** minutes discharging the patient  This time was spent on the day of discharge  I had direct contact with the patient on the day of discharge  Greater than 50% of the total time was spent examining patient, answering all patient questions, arranging and discussing plan of care with patient as well as directly providing post-discharge instructions  Additional time then spent on discharge activities  Discharge Medications:  See after visit summary for reconciled discharge medications provided to patient and/or family        **Please Note: This note may have been constructed using a voice recognition system** no chest pain  Vitals: Blood Pressure: 141/82 (04/21/23 1249)  Pulse: 62 (04/21/23 1249)  Temperature: 97 8 °F (36 6 °C) (04/21/23 1249)  Temp Source: Oral (04/21/23 0745)  Respirations: 17 (04/20/23 1858)  Height: 6' (182 9 cm) (04/20/23 1530)  Weight - Scale: 81 6 kg (180 lb) (04/20/23 1530)  SpO2: 95 % (04/21/23 1249)  Exam:   Physical Exam  Constitutional:       General: He is not in acute distress  Appearance: He is obese  He is not ill-appearing, toxic-appearing or diaphoretic  HENT:      Head: Normocephalic and atraumatic  Right Ear: There is no impacted cerumen  Nose: No congestion  Mouth/Throat:      Pharynx: No oropharyngeal exudate  Cardiovascular:      Rate and Rhythm: Normal rate  Heart sounds: No murmur heard  No friction rub  No gallop  Pulmonary:      Effort: No respiratory distress  Breath sounds: No wheezing or rales  Abdominal:      General: There is no distension  Palpations: There is no mass  Tenderness: There is no abdominal tenderness  There is no guarding or rebound  Hernia: No hernia is present  Musculoskeletal:         General: No swelling, tenderness, deformity or signs of injury  Right lower leg: No edema  Left lower leg: No edema  Skin:     Coloration: Skin is not jaundiced or pale  Findings: No bruising, erythema, lesion or rash  Neurological:      Mental Status: He is oriented to person, place, and time  Psychiatric:         Behavior: Behavior normal           Discussion with Family: Patient  Discharge instructions/Information to patient and family:   See after visit summary for information provided to patient and family  Provisions for Follow-Up Care:  See after visit summary for information related to follow-up care and any pertinent home health orders  Disposition:   Home    Planned Readmission: No plan for readmission     Discharge Statement:  I spent 45 minutes discharging the patient  This time was spent on the day of discharge  I had direct contact with the patient on the day of discharge  Greater than 50% of the total time was spent examining patient, answering all patient questions, arranging and discussing plan of care with patient as well as directly providing post-discharge instructions  Additional time then spent on discharge activities  Discharge Medications:  See after visit summary for reconciled discharge medications provided to patient and/or family        **Please Note: This note may have been constructed using a voice recognition system**

## 2023-04-21 NOTE — DISCHARGE INSTR - AVS FIRST PAGE
1  Please see the post cardiac catheterization dishcarge instructions  No heavy lifting, greater than 10 lbs  or strenuous  activity for 48 hrs  2 Remove band aid tomorrow  Shower and wash area- wrist gently with soap and water- beginning tomorrow  Rinse and pat dry  Apply new water seal band aid  Repeat this process for 5 days  No powders, creams lotions or antibiotic ointments  for 5 days  No tub baths, hot tubs or swimming for 5 days  3  Please call our office ([phone]) if you have any fever, redness, swelling, discharge from your wrist access site      4 No driving for 1 day

## 2023-04-21 NOTE — PLAN OF CARE
Problem: CARDIOVASCULAR - ADULT  Goal: Maintains optimal cardiac output and hemodynamic stability  Description: INTERVENTIONS:  - Monitor I/O, vital signs and rhythm  - Monitor for S/S and trends of decreased cardiac output  - Administer and titrate ordered vasoactive medications to optimize hemodynamic stability  - Assess quality of pulses, skin color and temperature  - Assess for signs of decreased coronary artery perfusion  - Instruct patient to report change in severity of symptoms  Outcome: Progressing  Goal: Absence of cardiac dysrhythmias or at baseline rhythm  Description: INTERVENTIONS:  - Continuous cardiac monitoring, vital signs, obtain 12 lead EKG if ordered  - Administer antiarrhythmic and heart rate control medications as ordered  - Monitor electrolytes and administer replacement therapy as ordered  Outcome: Progressing     Problem: RESPIRATORY - ADULT  Goal: Achieves optimal ventilation and oxygenation  Description: INTERVENTIONS:  - Assess for changes in respiratory status  - Assess for changes in mentation and behavior  - Position to facilitate oxygenation and minimize respiratory effort  - Oxygen administered by appropriate delivery if ordered  - Initiate smoking cessation education as indicated  - Encourage broncho-pulmonary hygiene including cough, deep breathe, Incentive Spirometry  - Assess the need for suctioning and aspirate as needed  - Assess and instruct to report SOB or any respiratory difficulty  - Respiratory Therapy support as indicated  Outcome: Progressing     Problem: PAIN - ADULT  Goal: Verbalizes/displays adequate comfort level or baseline comfort level  Description: Interventions:  - Encourage patient to monitor pain and request assistance  - Assess pain using appropriate pain scale  - Administer analgesics based on type and severity of pain and evaluate response  - Implement non-pharmacological measures as appropriate and evaluate response  - Consider cultural and social influences on pain and pain management  - Notify physician/advanced practitioner if interventions unsuccessful or patient reports new pain  Outcome: Progressing     Problem: SAFETY ADULT  Goal: Patient will remain free of falls  Description: INTERVENTIONS:  - Educate patient/family on patient safety including physical limitations  - Instruct patient to call for assistance with activity   - Consult OT/PT to assist with strengthening/mobility   - Keep Call bell within reach  - Keep bed low and locked with side rails adjusted as appropriate  - Keep care items and personal belongings within reach  - Initiate and maintain comfort rounds  - Make Fall Risk Sign visible to staff  - Apply yellow socks and bracelet for high fall risk patients  - Consider moving patient to room near nurses station  Outcome: Progressing

## (undated) DEVICE — TR BAND RADIAL ARTERY COMPRESSION DEVICE: Brand: TR BAND

## (undated) DEVICE — GLIDESHEATH BASIC HYDROPHILIC COATED INTRODUCER SHEATH: Brand: GLIDESHEATH

## (undated) DEVICE — Device: Brand: ASAHI SILVERWAY

## (undated) DEVICE — RADIFOCUS OPTITORQUE ANGIOGRAPHIC CATHETER: Brand: OPTITORQUE